# Patient Record
Sex: MALE | Race: WHITE | Employment: FULL TIME | URBAN - METROPOLITAN AREA
[De-identification: names, ages, dates, MRNs, and addresses within clinical notes are randomized per-mention and may not be internally consistent; named-entity substitution may affect disease eponyms.]

---

## 2017-03-03 ENCOUNTER — OFFICE VISIT (OUTPATIENT)
Dept: CARDIOLOGY CLINIC | Age: 72
End: 2017-03-03

## 2017-03-03 VITALS
BODY MASS INDEX: 31.61 KG/M2 | DIASTOLIC BLOOD PRESSURE: 80 MMHG | WEIGHT: 225.8 LBS | HEIGHT: 71 IN | SYSTOLIC BLOOD PRESSURE: 130 MMHG | HEART RATE: 66 BPM | RESPIRATION RATE: 16 BRPM

## 2017-03-03 DIAGNOSIS — E78.5 DYSLIPIDEMIA, GOAL LDL BELOW 70: ICD-10-CM

## 2017-03-03 DIAGNOSIS — I25.10 CORONARY ARTERY DISEASE INVOLVING NATIVE CORONARY ARTERY OF NATIVE HEART WITHOUT ANGINA PECTORIS: Primary | ICD-10-CM

## 2017-03-03 DIAGNOSIS — I10 HTN (HYPERTENSION), BENIGN: ICD-10-CM

## 2017-03-03 NOTE — PROGRESS NOTES
[facility] [address]:   [phone]      HPI: Real Thomason, a 73y.o. year-old who presents for follow up regarding CAD. Still having right leg pain from sciatica, undergoing treatment  Hurts mostly with standing or after he stops walking   No epigastric pain or unsteadiness recently (his angina equivalent)  Denies chest pain or dyspnea  Denies dizziness  No syncope  No LE edema  Not exercising due to sciatic pain  Prior to his MI in 2014 he felt unsteady on his feet and had epigastric pain  Says he thinks he has low testosterone     Assessment/Plan:  1. CAD - LAD 99% proximal lesion with STEMI in 3/14, s/p PCI with LARA to LAD, MLI in other vessels, off Effient now, continue ASA 81MU daily, Bystolic and Lipitor, follow up in 1 year  -asymptomatic, will plan to reassess for ischemia 3 years post stress test   2. Dyslipidemia - Lipids 2/17 - , TG 69, LDL 56, HDL 31, continue atorvastatin  3. Obesity - Body mass index is 31.49 kg/(m^2). cont weight loss efforts  4. HTN-well controlled on Bystolic   5. VT--post cath/stent, on amio drip while inpatient, on bystolic now without recurrence. 6. Hx of chronic systolic heart failure - LVEF 30% post cath but now up to 65% by TTE 0/50, on Bystolic    7. Hypothyroidism - TSH 3.4 in 2/17, on levothyroxine  8. Edema - minimal today  9. Fatigue- improved on bystolic    10. Possible low testosterone - discussed referral to endocrinology for evaluation and treatment options but he prefers to wait on that at this time    Echo 5/16: LVEF 65%, grade 1 dd, mild MR, mild TR  Nuclear: 2/16, NL Mirna nuc, EF 58%  Echo 12/14 EF 65%    TTE 3/14 EF 30%, multiple WMA  Cath 3/14 100% proximal LAD, [id number] LARA  EF 30%, Mod  Anteroapical hypokinesis, normal valves    Soc no tob no etoh   Fhx + for CAD in father    He  has a past medical history of CAD (coronary artery disease); Essential hypertension;  Hyperlipidemia; STEMI (ST elevation myocardial infarction) ([address] (3/10/2014); Thyroid disease; and Unspecified sleep apnea. Cardiovascular ROS: no chest pain or dyspnea on exertion  Respiratory ROS: no cough, wheezing  Neurological ROS: no TIA or stroke symptoms  All other systems negative except as above. PE  Vitals:    03/03/17 0949   BP: 130/80   Pulse: 66   Resp: 16   Weight: 225 lb 12.8 oz (102.4 kg)   Height: 5' 11\" (1.803 m)    Body mass index is 31.49 kg/(m^2).    General appearance - alert, well appearing, and in no distress  Mental status - affect appropriate to mood  Eyes - sclera anicteric, moist mucous membranes  Neck - supple, no significant adenopathy  Lymphatics - no  lymphadenopathy  Chest - clear to auscultation, no wheezes, rales or rhonchi  Heart - normal rate, regular rhythm, normal S1, S2, no murmurs, rubs, clicks or gallops  Abdomen - soft, nontender, nondistended  Back exam - full range of motion, no tenderness  Neurological - cranial nerves II through XII grossly intact, no focal deficit  Musculoskeletal - no muscular tenderness noted, normal strength  Extremities - peripheral pulses normal, trace LE edema  Skin - normal coloration  no rashes    12 lead ECG: NSR with poor R wave progression    Recent Labs:  Lab Results   Component Value Date/Time    Cholesterol, total 115 05/20/2016 09:12 AM    HDL Cholesterol 36 05/20/2016 09:12 AM    LDL, calculated 62 05/20/2016 09:12 AM    Triglyceride 84 05/20/2016 09:12 AM    CHOL/HDL Ratio 4.3 03/06/2014 07:22 AM     Lab Results   Component Value Date/Time    Creatinine 1.17 05/20/2016 09:12 AM     Lab Results   Component Value Date/Time    BUN 20 05/20/2016 09:12 AM     Lab Results   Component Value Date/Time    Potassium 4.5 05/20/2016 09:12 AM     Lab Results   Component Value Date/Time    Hemoglobin A1c 6.0 03/06/2014 04:53 PM     Lab Results   Component Value Date/Time    HGB 15.1 05/20/2016 09:12 AM     Lab Results   Component Value Date/Time    PLATELET 407 42/69/1706 09:12 AM       Reviewed:  Past Medical History:   Diagnosis Date    CAD (coronary artery disease)     Essential hypertension     Hyperlipidemia     STEMI (ST elevation myocardial infarction) ([facility] 3/10/2014    Thyroid disease     Unspecified sleep apnea      History   Smoking Status    Never Smoker   Smokeless Tobacco    Never Used     History   Alcohol Use    Yes     Comment: once or twice a month     Allergies   Allergen Reactions    Shellfish Derived Itching       Current Outpatient Prescriptions   Medication Sig    nitroglycerin (NITROSTAT) 0.4 mg SL tablet 1 Tab by SubLINGual route every five (5) minutes as needed for Chest Pain.  BYSTOLIC 2.5 mg tablet Take 1 tablet by mouth  daily    atorvastatin (LIPITOR) 20 mg tablet Take 1 tablet by mouth  nightly    levothyroxine (SYNTHROID) 25 mcg tablet 25 mcg daily.  naproxen sodium (ALEVE) 220 mg cap Take 440 mg by mouth as needed.  multivitamin (ONE A DAY) tablet Take 1 tablet by mouth daily.  aspirin 81 mg chewable tablet Take 2 Tabs by mouth daily. (Patient taking differently: Take 81 mg by mouth daily.)     No current facility-administered medications for this visit.         Jacqueline Pimentel MD  [facility] heart and Vascular [place]  [address] [facility] [address]  [address]

## 2017-03-03 NOTE — MR AVS SNAPSHOT
Visit Information Date & Time Provider Department Dept. Phone Encounter #  
 3/3/2017  9:40 AM Meryl Lizama MD [facility] [phone] [id number] Upcoming Health Maintenance Date Due Hepatitis C Screening 1945 FOBT Q 1 YEAR AGE 50-75 8/20/1997 DTaP/Tdap/Td series (1 - Tdap) 10/8/2005 ZOSTER VACCINE AGE 60> 8/20/2007 GLAUCOMA SCREENING Q2Y 8/20/2012 Pneumococcal 65+ Low/Medium Risk (1 of 2 - PCV13) 8/20/2012 MEDICARE YEARLY EXAM 8/20/2012 INFLUENZA AGE 9 TO ADULT 8/1/2016 Allergies as of 3/3/2017  Review Complete On: 3/3/2017 By: Jane Hoyos Severity Noted Reaction Type Reactions Shellfish Derived  05/08/2014    Itching Current Immunizations  Reviewed on 3/28/2014 Name Date Influenza Vaccine  Deferred (Patient Refused) Pneumococcal Polysaccharide (PPSV-23)  Deferred (Patient Refused) TD Vaccine 10/7/2005 Not reviewed this visit You Were Diagnosed With   
  
 Codes Comments Coronary artery disease involving native coronary artery of native heart without angina pectoris    -  Primary ICD-10-CM: I25.10 ICD-9-CM: 414.01   
 HTN (hypertension), benign     ICD-10-CM: I10 
ICD-9-CM: 401.1 Dyslipidemia, goal LDL below 70     ICD-10-CM: E78.5 ICD-9-CM: 272.4 Vitals BP  
  
  
  
  
  
 130/80 (BP 1 Location: Left arm, BP Patient Position: Sitting) Vitals History BMI and BSA Data Body Mass Index Body Surface Area  
 31.49 kg/m 2 2.26 m 2 Preferred Pharmacy Pharmacy Name Phone [facility] [phone] Your Updated Medication List  
  
   
This list is accurate as of: 3/3/17 10:22 AM.  Always use your most recent med list.  
  
  
  
  
 ALEVE 220 mg Cap Generic drug:  naproxen sodium Take 440 mg by mouth as needed. aspirin 81 mg chewable tablet Take 2 Tabs by mouth daily. atorvastatin 20 mg tablet Commonly known as:  LIPITOR Take 1 tablet by mouth  nightly BYSTOLIC 2.5 mg tablet Generic drug:  nebivolol Take 1 tablet by mouth  daily  
  
 levothyroxine 25 mcg tablet Commonly known as:  SYNTHROID  
25 mcg daily. multivitamin tablet Commonly known as:  ONE A DAY Take 1 tablet by mouth daily. nitroglycerin 0.4 mg SL tablet Commonly known as:  NITROSTAT  
1 Tab by SubLINGual route every five (5) minutes as needed for Chest Pain. We Performed the Following AMB POC EKG ROUTINE W/ 12 LEADS, INTER & REP [01372 CPT(R)] Patient Instructions Try to get as much exercise as possible and limit your carbohydrate intake Introducing \[facility]\"" & HEALTH SERVICES! Katelyn Mckinney introduces [employer] patient portal. Now you can access parts of your medical record, email your doctor's office, and request medication refills online. 1. In your internet browser, go to [email or web address]/Bill Me Later 2. Click on the First Time User? Click Here link in the Sign In box. You will see the New Member Sign Up page. 3. Enter your [employer] Access Code exactly as it appears below. You will not need to use this code after youve completed the sign-up process. If you do not sign up before the expiration date, you must request a new code. · [employer] Access Code: [email or web address] Expires: 6/1/2017 10:22 AM 
 
4. Enter the last four digits of your Social Security Number (xxxx) and Date of Birth (mm/dd/yyyy) as indicated and click Submit. You will be taken to the next sign-up page. 5. Create a [employer] ID. This will be your [employer] login ID and cannot be changed, so think of one that is secure and easy to remember. 6. Create a [employer] password. You can change your password at any time. 7. Enter your Password Reset Question and Answer. This can be used at a later time if you forget your password. 8. Enter your e-mail address. You will receive e-mail notification when new information is available in [address] 9. Click Sign Up. You can now view and download portions of your medical record. 10. Click the Download Summary menu link to download a portable copy of your medical information. If you have questions, please visit the Frequently Asked Questions section of the [employer] website. Remember, [employer] is NOT to be used for urgent needs. For medical emergencies, dial 911. Now available from your iPhone and Android! Please provide this summary of care documentation to your next provider. Your primary care clinician is listed as Chris Reynolds. If you have any questions after today's visit, please call [phone].

## 2017-07-11 RX ORDER — NEBIVOLOL HYDROCHLORIDE 2.5 MG/1
TABLET ORAL
Qty: 90 TAB | Refills: 3 | Status: SHIPPED | OUTPATIENT
Start: 2017-07-11 | End: 2017-10-13 | Stop reason: SDUPTHER

## 2017-11-24 RX ORDER — ATORVASTATIN CALCIUM 20 MG/1
20 TABLET, FILM COATED ORAL DAILY
Qty: 90 TAB | Refills: 3 | Status: SHIPPED | OUTPATIENT
Start: 2017-11-24 | End: 2018-11-12 | Stop reason: SDUPTHER

## 2017-11-24 RX ORDER — NEBIVOLOL 2.5 MG/1
TABLET ORAL
Qty: 90 TAB | Refills: 3 | Status: SHIPPED | OUTPATIENT
Start: 2017-11-24 | End: 2018-07-17 | Stop reason: ALTCHOICE

## 2017-11-24 RX ORDER — ATORVASTATIN CALCIUM 20 MG/1
20 TABLET, FILM COATED ORAL DAILY
Qty: 90 TAB | Refills: 3 | Status: SHIPPED | OUTPATIENT
Start: 2017-11-24 | End: 2017-11-24 | Stop reason: SDUPTHER

## 2017-11-24 NOTE — TELEPHONE ENCOUNTER
Requested Prescriptions     Signed Prescriptions Disp Refills    nebivolol (BYSTOLIC) 2.5 mg tablet 90 Tab 3     Sig: Take 1 tablet by mouth  daily     Authorizing Provider: Samina [facility]     Ordering User: Rose Driscoll    atorvastatin (LIPITOR) 20 mg tablet 90 Tab 3     Sig: Take 1 Tab by mouth daily. Authorizing Provider: Samina Vasquez     Ordering User: Rose Driscoll     Called patient, verified identity. Patient states he recently changed his insurance company and needs new script of Bystolic and lipitor. ALso needs a 2 week supply of lipitor to hold him over until mail delivery takes effect.

## 2017-11-24 NOTE — TELEPHONE ENCOUNTER
Please 2 week supply of Atovastatin and Bystolic  to [facility] [phone].     Send 90 days supply of Atovastatin and Bystolic to [facility] [phone]

## 2018-03-02 ENCOUNTER — OFFICE VISIT (OUTPATIENT)
Dept: CARDIOLOGY CLINIC | Age: 73
End: 2018-03-02

## 2018-03-02 VITALS
RESPIRATION RATE: 16 BRPM | WEIGHT: 226.6 LBS | DIASTOLIC BLOOD PRESSURE: 84 MMHG | HEART RATE: 70 BPM | SYSTOLIC BLOOD PRESSURE: 120 MMHG | BODY MASS INDEX: 31.72 KG/M2 | HEIGHT: 71 IN

## 2018-03-02 DIAGNOSIS — E78.5 DYSLIPIDEMIA, GOAL LDL BELOW 70: ICD-10-CM

## 2018-03-02 DIAGNOSIS — I10 HTN (HYPERTENSION), BENIGN: Primary | ICD-10-CM

## 2018-03-02 DIAGNOSIS — I21.3 ST ELEVATION MYOCARDIAL INFARCTION (STEMI), UNSPECIFIED ARTERY (HCC): ICD-10-CM

## 2018-03-02 DIAGNOSIS — I25.10 CORONARY ARTERY DISEASE INVOLVING NATIVE CORONARY ARTERY OF NATIVE HEART WITHOUT ANGINA PECTORIS: ICD-10-CM

## 2018-03-02 NOTE — MR AVS SNAPSHOT
[address] [facility] [address] [place] 
[phone] Patient: Steve Barton MRN:  [id number] Visit Information Date & Time Provider Department Dept. Phone Encounter #  
 3/2/2018  9:40 AM Katie Hill MD [facility] [place] [phone] [id number] Your Appointments 3/5/2019  9:00 AM  
STRESS ECHOCARDIOGRAMS with [facility] ([place] SCHEDULING) Appt Note: stress echo for CAD 9:00 Dr. Sage Priest 10:00 kmr [address] [place] [address]  
[address] [place] [address]  
  
    
 3/5/2019  9:00 AM  
ECHO CARDIOGRAMS 2D with ECHO, [facility] ([place] SCHEDULING) Appt Note: stress echo for CAD 9:00 Dr. Sage Priest 10:00 kmr [address] [facility] [address]  
[address] [place] [address]  
  
    
 3/5/2019 10:00 AM  
ESTABLISHED PATIENT with Katie Hill MD  
[facility] ([address] [facility]) Appt Note: stress echo for CAD 9:00 Dr. Sage Priest 10:00 kmr [address] [place] [address]  
[address] [facility],[address] [address] Upcoming Health Maintenance Date Due Hepatitis C Screening 1945 FOBT Q 1 YEAR AGE 50-75 8/20/1997 DTaP/Tdap/Td series (1 - Tdap) 10/8/2005 ZOSTER VACCINE AGE 60> 6/20/2007 GLAUCOMA SCREENING Q2Y 8/20/2012 Pneumococcal 65+ Low/Medium Risk (1 of 2 - PCV13) 8/20/2012 MEDICARE YEARLY EXAM 8/20/2012 Influenza Age 5 to Adult 8/1/2017 Allergies as of 3/2/2018  Review Complete On: 3/2/2018 By: Alondra Mackenzie Severity Noted Reaction Type Reactions Shellfish Derived  05/08/2014    Itching Current Immunizations  Reviewed on 3/28/2014 Name Date Influenza Vaccine  Deferred (Patient Refused) Pneumococcal Polysaccharide (PPSV-23)  Deferred (Patient Refused) TD Vaccine 10/7/2005 Not reviewed this visit You Were Diagnosed With   
  
 Codes Comments HTN (hypertension), benign    -  Primary ICD-10-CM: I10 
ICD-9-CM: 401.1 Dyslipidemia, goal LDL below 70     ICD-10-CM: E78.5 ICD-9-CM: 272.4 Coronary artery disease involving native coronary artery of native heart without angina pectoris     ICD-10-CM: I25.10 ICD-9-CM: 414.01   
 ST elevation myocardial infarction (STEMI), unspecified artery ([facility] [address]     ICD-10-CM: I21.3 ICD-9-CM: 410.90 Vitals BP Pulse Resp Height(growth percentile) Weight(growth percentile) BMI  
 120/84 (BP 1 Location: Left arm, BP Patient Position: Sitting) 70 16 5' 11\" (1.803 m) 226 lb 9.6 oz (102.8 kg) 31.6 kg/m2 Smoking Status Never Smoker Vitals History BMI and BSA Data Body Mass Index Body Surface Area  
 31.6 kg/m 2 2.27 m 2 Preferred Pharmacy Pharmacy Name Phone [address] [facility] [phone] Your Updated Medication List  
  
   
This list is accurate as of 3/2/18 10:12 AM.  Always use your most recent med list.  
  
  
  
  
 ALEVE 220 mg Cap Generic drug:  naproxen sodium Take 440 mg by mouth as needed. aspirin 81 mg chewable tablet Take 2 Tabs by mouth daily. atorvastatin 20 mg tablet Commonly known as:  LIPITOR Take 1 Tab by mouth daily. levothyroxine 25 mcg tablet Commonly known as:  SYNTHROID  
25 mcg daily. multivitamin tablet Commonly known as:  ONE A DAY Take 1 tablet by mouth daily. nebivolol 2.5 mg tablet Commonly known as:  BYSTOLIC Take 1 tablet by mouth  daily  
  
 nitroglycerin 0.4 mg SL tablet Commonly known as:  NITROSTAT 1 Tab by SubLINGual route every five (5) minutes as needed for Chest Pain. We Performed the Following AMB POC EKG ROUTINE W/ 12 LEADS, INTER & REP [93752 CPT(R)] Introducing [facility] & [facility] SERVICES! [employer] introduces [employer] patient portal. Now you can access parts of your medical record, email your doctor's office, and request medication refills online. 1. In your internet browser, go to [email or web address] Who Launch 2. Click on the First Time User? Click Here link in the Sign In box. You will see the New Member Sign Up page. 3. Enter your [employer] Access Code exactly as it appears below. You will not need to use this code after youve completed the sign-up process. If you do not sign up before the expiration date, you must request a new code. · [employer] Access Code: [email or web address] Expires: 5/31/2018  8:33 AM 
 
4. Enter the last four digits of your Social Security Number (xxxx) and Date of Birth (mm/dd/yyyy) as indicated and click Submit. You will be taken to the next sign-up page. 5. Create a [employer] ID. This will be your [employer] login ID and cannot be changed, so think of one that is secure and easy to remember. 6. Create a [employer] password. You can change your password at any time. 7. Enter your Password Reset Question and Answer. This can be used at a later time if you forget your password. 8. Enter your e-mail address. You will receive e-mail notification when new information is available in [address] 9. Click Sign Up. You can now view and download portions of your medical record. 10. Click the Download Summary menu link to download a portable copy of your medical information. If you have questions, please visit the Frequently Asked Questions section of the [employer] website. Remember, [employer] is NOT to be used for urgent needs. For medical emergencies, dial 911. Now available from your iPhone and Android! Please provide this summary of care documentation to your next provider. If you have any questions after today's visit, please call [phone].

## 2018-03-02 NOTE — PROGRESS NOTES
[facility] [place] [address]:   [phone]    HPI: Kari Lafleur, a 81y.o. year-old who presents for follow up regarding CAD. He is trying a new male enhancement supplement-Invigorate. Still having right leg pain from sciatica, undergoing treatment  Hurts mostly with standing or after he stops walking   No epigastric pain or unsteadiness recently (his angina equivalent)  Denies chest pain or dyspnea  Denies dizziness, syncope, LE edema  Not exercising due to sciatic pain  Prior to his MI in 2014 he felt unsteady on his feet and had epigastric pain  Says he thinks he has low testosterone     Assessment/Plan:  1. CAD - LAD 99% proximal lesion with STEMI in 3/14, s/p PCI with LARA to LAD, MLI in other vessels, off Effient now, continue ASA 93CB daily, Bystolic and Lipitor  -asymptomatic, will plan to reassess for ischemia 3 years post stress test   2. Dyslipidemia - Lipids good in 917 with Pt Firs  3. Obesity - Body mass index is 31.6 kg/(m^2). cont weight loss efforts  4. HTN-well controlled on Bystolic   5. VT--post cath/stent, on amio drip while inpatient, on bystolic now without recurrence. 6. Hx of chronic systolic heart failure - LVEF 30% post cath but now up to 65% by TTE 6/79, on Bystolic    7. Hypothyroidism - TSH 3.4 in 2/17, on levothyroxine  8. Edema - minimal today  9. Fatigue- improved on bystolic    10. Possible low testosterone - discussed referral to endocrinology for evaluation and treatment options but he prefers to wait on that at this time  6. PreDM- new diagnosis will watch diet and exercise    Echo 5/16: LVEF 65%, grade 1 dd, mild MR, mild TR  Nuclear: 2/16, NL Mirna nuc, EF 58%  Echo 12/14 EF 65%    TTE 3/14 EF 30%, multiple WMA  Cath 3/14 100% proximal LAD, [id number] LARA  EF 30%, Mod  Anteroapical hypokinesis, normal valves    Soc no tob no etoh   Fhx + for CAD in father    He  has a past medical history of CAD (coronary artery disease); Essential hypertension;  Hyperlipidemia; [facility]) [facility] (3/10/2014); Thyroid disease; and Unspecified sleep apnea. Cardiovascular ROS: no chest pain or dyspnea on exertion  Respiratory ROS: no cough, wheezing  Neurological ROS: no TIA or stroke symptoms  All other systems negative except as above. PE  Vitals:    03/02/18 0948   BP: 120/84   Pulse: 70   Resp: 16   Weight: 226 lb 9.6 oz (102.8 kg)   Height: 5' 11\" (1.803 m)    Body mass index is 31.6 kg/(m^2).    General appearance - alert, well appearing, and in no distress  Mental status - affect appropriate to mood  Eyes - sclera anicteric, moist mucous membranes  Neck - supple, no significant adenopathy  Lymphatics - no  lymphadenopathy  Chest - clear to auscultation, no wheezes, rales or rhonchi  Heart - normal rate, regular rhythm, normal S1, S2, no murmurs, rubs, clicks or gallops  Abdomen - soft, nontender, nondistended  Back exam - full range of motion, no tenderness  Neurological - cranial nerves II through XII grossly intact, no focal deficit  Musculoskeletal - no muscular tenderness noted, normal strength  Extremities - peripheral pulses normal, trace LE edema  Skin - normal coloration  no rashes    12 lead ECG: NSR with poor R wave progression    Recent Labs:  Lab Results   Component Value Date/Time    Cholesterol, total 115 05/20/2016 09:12 AM    HDL Cholesterol 36 (L) 05/20/2016 09:12 AM    LDL, calculated 62 05/20/2016 09:12 AM    Triglyceride 84 05/20/2016 09:12 AM    CHOL/HDL Ratio 4.3 03/06/2014 07:22 AM     Lab Results   Component Value Date/Time    Creatinine 1.17 05/20/2016 09:12 AM     Lab Results   Component Value Date/Time    BUN 20 05/20/2016 09:12 AM     Lab Results   Component Value Date/Time    Potassium 4.5 05/20/2016 09:12 AM     Lab Results   Component Value Date/Time    Hemoglobin A1c 6.0 03/06/2014 04:53 PM     Lab Results   Component Value Date/Time    HGB 15.1 05/20/2016 09:12 AM     Lab Results   Component Value Date/Time    PLATELET 686 05/20/2016 09:12 AM       Reviewed:  Past Medical History:   Diagnosis Date    CAD (coronary artery disease)     Essential hypertension     Hyperlipidemia     STEMI (ST elevation myocardial infarction) ([facility] [address] 3/10/2014    Thyroid disease     Unspecified sleep apnea      History   Smoking Status    Never Smoker   Smokeless Tobacco    Never Used     History   Alcohol Use    Yes     Comment: once or twice a month     Allergies   Allergen Reactions    Shellfish Derived Itching       Current Outpatient Prescriptions   Medication Sig    nebivolol (BYSTOLIC) 2.5 mg tablet Take 1 tablet by mouth  daily    atorvastatin (LIPITOR) 20 mg tablet Take 1 Tab by mouth daily.  nitroglycerin (NITROSTAT) 0.4 mg SL tablet 1 Tab by SubLINGual route every five (5) minutes as needed for Chest Pain.  levothyroxine (SYNTHROID) 25 mcg tablet 25 mcg daily.  naproxen sodium (ALEVE) 220 mg cap Take 440 mg by mouth as needed.  multivitamin (ONE A DAY) tablet Take 1 tablet by mouth daily.  aspirin 81 mg chewable tablet Take 2 Tabs by mouth daily. (Patient taking differently: Take 81 mg by mouth daily.)     No current facility-administered medications for this visit.         Molly Lima MD  [facility] heart and Vascular [place]  [address] [facility] [address]  [address]

## 2018-05-22 ENCOUNTER — TELEPHONE (OUTPATIENT)
Dept: CARDIOLOGY CLINIC | Age: 73
End: 2018-05-22

## 2018-05-22 RX ORDER — NITROGLYCERIN 0.4 MG/1
0.4 TABLET SUBLINGUAL
Qty: 1 BOTTLE | Refills: 2 | Status: SHIPPED | OUTPATIENT
Start: 2018-05-22 | End: 2019-11-18 | Stop reason: SDUPTHER

## 2018-05-22 NOTE — TELEPHONE ENCOUNTER
Pharmacy verified. Requested Prescriptions     Pending Prescriptions Disp Refills    nitroglycerin (NITROSTAT) 0.4 mg SL tablet 1 Bottle 2     Si Tab by SubLINGual route every five (5) minutes as needed for Chest Pain. Thanks!

## 2018-05-22 NOTE — TELEPHONE ENCOUNTER
Requested Prescriptions     Signed Prescriptions Disp Refills    nitroglycerin (NITROSTAT) 0.4 mg SL tablet 1 Bottle 2     Si Tab by SubLINGual route every five (5) minutes as needed for Chest Pain.  Max dose of 3     Authorizing Provider: Presley Dong     Ordering User: George Broderick     Per orders

## 2018-07-16 ENCOUNTER — TELEPHONE (OUTPATIENT)
Dept: CARDIOLOGY CLINIC | Age: 73
End: 2018-07-16

## 2018-07-16 NOTE — TELEPHONE ENCOUNTER
Patient said bystolic is too much money for him now, is there an alternative he could take that may be cheaper   Phone: [phone]

## 2018-07-17 ENCOUNTER — TELEPHONE (OUTPATIENT)
Dept: CARDIOLOGY CLINIC | Age: 73
End: 2018-07-17

## 2018-07-17 RX ORDER — METOPROLOL SUCCINATE 25 MG/1
TABLET, EXTENDED RELEASE ORAL DAILY
COMMUNITY
End: 2018-07-17 | Stop reason: SDUPTHER

## 2018-07-17 RX ORDER — METOPROLOL SUCCINATE 25 MG/1
25 TABLET, EXTENDED RELEASE ORAL DAILY
Qty: 30 TAB | Refills: 6 | Status: SHIPPED | OUTPATIENT
Start: 2018-07-17 | End: 2018-10-05 | Stop reason: SDUPTHER

## 2018-07-17 NOTE — TELEPHONE ENCOUNTER
Returned patient's call, 2 pt identifiers used  Discussed new medication. Advised it Metoprolol Succinate is generic now.

## 2018-07-17 NOTE — TELEPHONE ENCOUNTER
I spoke to patient and advised per NP rowena to stop Bystolic and he may switch to Toprol XL 25 mg 1 tab daily. He will monitor his blood pressure and call in 2 weeks with an update. 2 pt identifiers used      Requested Prescriptions     Signed Prescriptions Disp Refills    metoprolol succinate (TOPROL XL) 25 mg XL tablet 30 Tab 6     Sig: Take 1 Tab by mouth daily.      Authorizing Provider: Senthil Howard     Ordering User: Helen Soloroi

## 2018-07-17 NOTE — TELEPHONE ENCOUNTER
He can stop bystolic and begin toprol XL 25mg daily. Please ask him to check BP daily and call the office in 2 weeks with his readings after making this change.

## 2018-10-05 RX ORDER — METOPROLOL SUCCINATE 25 MG/1
25 TABLET, EXTENDED RELEASE ORAL DAILY
Qty: 90 TAB | Refills: 3 | Status: SHIPPED | OUTPATIENT
Start: 2018-10-05 | End: 2019-09-30 | Stop reason: SDUPTHER

## 2018-10-05 NOTE — TELEPHONE ENCOUNTER
Requested Prescriptions     Signed Prescriptions Disp Refills    metoprolol succinate (TOPROL XL) 25 mg XL tablet 90 Tab 3     Sig: Take 1 Tab by mouth daily.      Authorizing Provider: Monico Dutton     Ordering User: Patria Lim     Per orders

## 2018-11-12 RX ORDER — ATORVASTATIN CALCIUM 20 MG/1
20 TABLET, FILM COATED ORAL DAILY
Qty: 90 TAB | Refills: 3 | Status: SHIPPED | OUTPATIENT
Start: 2018-11-12 | End: 2020-02-19 | Stop reason: SDUPTHER

## 2018-11-12 NOTE — TELEPHONE ENCOUNTER
Requested Prescriptions     Signed Prescriptions Disp Refills    atorvastatin (LIPITOR) 20 mg tablet 90 Tab 3     Sig: Take 1 Tab by mouth daily.      Authorizing Provider: Karol Daly     Ordering User: Verena Enciso     Per verbal orders

## 2019-02-27 DIAGNOSIS — I25.10 CORONARY ARTERY DISEASE INVOLVING NATIVE CORONARY ARTERY OF NATIVE HEART WITHOUT ANGINA PECTORIS: Primary | ICD-10-CM

## 2019-03-01 ENCOUNTER — OFFICE VISIT (OUTPATIENT)
Dept: CARDIOLOGY CLINIC | Age: 74
End: 2019-03-01

## 2019-03-01 VITALS
BODY MASS INDEX: 31.11 KG/M2 | RESPIRATION RATE: 16 BRPM | WEIGHT: 222.2 LBS | HEIGHT: 71 IN | DIASTOLIC BLOOD PRESSURE: 80 MMHG | SYSTOLIC BLOOD PRESSURE: 140 MMHG | HEART RATE: 88 BPM

## 2019-03-01 DIAGNOSIS — E78.5 DYSLIPIDEMIA, GOAL LDL BELOW 70: ICD-10-CM

## 2019-03-01 DIAGNOSIS — E78.5 DYSLIPIDEMIA: ICD-10-CM

## 2019-03-01 DIAGNOSIS — I10 HTN (HYPERTENSION), BENIGN: Primary | ICD-10-CM

## 2019-03-01 DIAGNOSIS — I25.10 CORONARY ARTERY DISEASE INVOLVING NATIVE CORONARY ARTERY OF NATIVE HEART WITHOUT ANGINA PECTORIS: ICD-10-CM

## 2019-03-01 DIAGNOSIS — E11.59 TYPE 2 DIABETES MELLITUS WITH OTHER CIRCULATORY COMPLICATION, WITHOUT LONG-TERM CURRENT USE OF INSULIN (HCC): ICD-10-CM

## 2019-09-30 RX ORDER — METOPROLOL SUCCINATE 25 MG/1
TABLET, EXTENDED RELEASE ORAL
Qty: 90 TAB | Refills: 3 | Status: SHIPPED | OUTPATIENT
Start: 2019-09-30 | End: 2020-08-26

## 2019-11-18 RX ORDER — NITROGLYCERIN 0.4 MG/1
0.4 TABLET SUBLINGUAL
Qty: 1 BOTTLE | Refills: 2 | Status: SHIPPED | OUTPATIENT
Start: 2019-11-18 | End: 2020-01-09

## 2019-11-18 NOTE — TELEPHONE ENCOUNTER
Requested Prescriptions     Signed Prescriptions Disp Refills    nitroglycerin (NITROSTAT) 0.4 mg SL tablet 1 Bottle 2     Si Tab by SubLINGual route every five (5) minutes as needed for Chest Pain (max dose of 3).  Max dose of 3     Authorizing Provider: Homa Greenwood     Ordering User: Roselia Pimentel     Per verbal orders

## 2020-01-09 RX ORDER — NITROGLYCERIN 0.4 MG/1
0.4 TABLET SUBLINGUAL
Qty: 25 TAB | Refills: 2 | Status: SHIPPED | OUTPATIENT
Start: 2020-01-09 | End: 2020-08-06

## 2020-03-03 ENCOUNTER — OFFICE VISIT (OUTPATIENT)
Dept: CARDIOLOGY CLINIC | Age: 75
End: 2020-03-03

## 2020-03-03 VITALS
RESPIRATION RATE: 16 BRPM | DIASTOLIC BLOOD PRESSURE: 70 MMHG | WEIGHT: 231 LBS | HEART RATE: 76 BPM | HEIGHT: 71 IN | SYSTOLIC BLOOD PRESSURE: 120 MMHG | BODY MASS INDEX: 32.34 KG/M2 | OXYGEN SATURATION: 97 %

## 2020-03-03 DIAGNOSIS — I25.10 CORONARY ARTERY DISEASE INVOLVING NATIVE CORONARY ARTERY OF NATIVE HEART WITHOUT ANGINA PECTORIS: ICD-10-CM

## 2020-03-03 DIAGNOSIS — R73.02 IGT (IMPAIRED GLUCOSE TOLERANCE): ICD-10-CM

## 2020-03-03 DIAGNOSIS — I21.3 ST ELEVATION MYOCARDIAL INFARCTION (STEMI), UNSPECIFIED ARTERY (HCC): Primary | ICD-10-CM

## 2020-03-03 DIAGNOSIS — I10 HTN (HYPERTENSION), BENIGN: ICD-10-CM

## 2020-03-03 DIAGNOSIS — E78.5 DYSLIPIDEMIA, GOAL LDL BELOW 70: ICD-10-CM

## 2020-03-03 RX ORDER — ATORVASTATIN CALCIUM 20 MG/1
20 TABLET, FILM COATED ORAL DAILY
Qty: 90 TAB | Refills: 3 | Status: SHIPPED | OUTPATIENT
Start: 2020-03-03 | End: 2021-05-05

## 2020-03-03 RX ORDER — LEVOTHYROXINE SODIUM 50 UG/1
TABLET ORAL
COMMUNITY

## 2020-03-03 NOTE — PROGRESS NOTES
[facility] [place] [facility]:   [phone]    HPI: Reno Girard, a 69y.o. year-old who presents for follow up regarding CAD. He is working full time again 8-3 comes home and takes a nap. Then when he gets up they have dinner. Encuraged him to exercise more, he is standing at work ,etc but just not walking/ exercising. He got a bad sneeze pain in the left chest a week ago that is still there now. Pulled an intercostal muscle it sounds like. But eating well. Snacks at work but not too much. Some fatigue, doing ok on Toprol, prefers it now due to cost(prev on bystolic)    No epigastric pain or unsteadiness recently (his angina equivalent)  Denies chest pain or dyspnea  Denies dizziness, syncope, LE edema  Not exercising due to sciatic pain and hip pain. Prior to his MI in 2014 he felt unsteady on his feet and had epigastric pain  Says he thinks he has low testosterone    **After his last office visit labs received 3/8/19 dated 3/6/19  CBC ok, Mg 2.1, A1C 6.0%  TSH 4.8  , TG 78, LDL 59, HDL 34  BUN 20, Cr 1.2, K 4.5  Labs need checking toay. Assessment/Plan:  1. CAD - LAD 99% proximal lesion with STEMI in 3/14, s/p PCI with LARA to LAD, MLI in other vessels, off Effient now, continue ASA 81mg daily toprol, atorvastatin  -just fatigue now, stress echo ok 3/19  2. Dyslipidemia - at goal on labs 3/19 LDL 59  3. Obesity - Body mass index is 32.22 kg/m². cont weight loss efforts  4. HTN-well controlled on Bystolic   5. VT--post cath/stent, on amio drip while inpatient, on metoprolol now, controlled, no sx  6. Hx of chronic systolic heart failure - LVEF 30% post cath but now up to 65% by TTE 0/59, on Bystolic    7. Hypothyroidism -  on levothyroxine  8. Edema - minimal today  9. Fatigue- improved on bystolic    10.  PreDM- new diagnosis will watch diet and exercise check a1c today    Stress Echo 3/19 6:00,   Echo 5/16: LVEF 65%, grade 1 dd, mild MR, mild TR  Nuclear: 2/16, NL Mirna nuc, EF 58%  Echo 12/14 EF 65%    TTE 3/14 EF 30%, multiple WMA  Cath 3/14 100% proximal LAD, [id number] LARA  EF 30%, Mod  Anteroapical hypokinesis, normal valves    Soc no tob no etoh   Fhx + for CAD in father    He  has a past medical history of CAD (coronary artery disease), Essential hypertension, Hyperlipidemia, STEMI (ST elevation myocardial infarction) ([address] (3/10/2014), Thyroid disease, and Unspecified sleep apnea. Cardiovascular ROS: no chest pain or dyspnea on exertion  Respiratory ROS: no cough, wheezing  Neurological ROS: no TIA or stroke symptoms  All other systems negative except as above. PE  Vitals:    03/03/20 0928   BP: 120/70   Pulse: 76   Resp: 16   SpO2: 97%   Weight: 231 lb (104.8 kg)   Height: 5' 11\" (1.803 m)    Body mass index is 32.22 kg/m².    General appearance - alert, well appearing, and in no distress  Mental status - affect appropriate to mood  Eyes - sclera anicteric, moist mucous membranes  Neck - supple, no significant adenopathy  Lymphatics - no  lymphadenopathy  Chest - clear to auscultation, no wheezes, rales or rhonchi  Heart - normal rate, regular rhythm, normal S1, S2, no murmurs, rubs, clicks or gallops  Abdomen - soft, nontender, nondistended  Back exam - full range of motion, no tenderness  Neurological - cranial nerves II through XII grossly intact, no focal deficit  Musculoskeletal - no muscular tenderness noted, normal strength  Extremities - peripheral pulses normal, trace LE edema  Skin - normal coloration  no rashes    12 lead ECG: NSR with poor R wave progression    Recent Labs:  Lab Results   Component Value Date/Time    Cholesterol, total 115 05/20/2016 09:12 AM    HDL Cholesterol 36 (L) 05/20/2016 09:12 AM    LDL, calculated 62 05/20/2016 09:12 AM    Triglyceride 84 05/20/2016 09:12 AM    CHOL/HDL Ratio 4.3 03/06/2014 07:22 AM     Lab Results   Component Value Date/Time    Creatinine 1.17 05/20/2016 09:12 AM     Lab Results   Component Value Date/Time    BUN 20 05/20/2016 09:12 AM     Lab Results   Component Value Date/Time    Potassium 4.5 05/20/2016 09:12 AM     Lab Results   Component Value Date/Time    Hemoglobin A1c 6.0 03/06/2014 04:53 PM     Lab Results   Component Value Date/Time    HGB 15.1 05/20/2016 09:12 AM     Lab Results   Component Value Date/Time    PLATELET 197 44/31/0106 09:12 AM       Reviewed:  Past Medical History:   Diagnosis Date    CAD (coronary artery disease)     Essential hypertension     Hyperlipidemia     STEMI (ST elevation myocardial infarction) ([address] 3/10/2014    Thyroid disease     Unspecified sleep apnea      Social History     Tobacco Use   Smoking Status Never Smoker   Smokeless Tobacco Never Used     Social History     Substance and Sexual Activity   Alcohol Use Yes    Frequency: 2-4 times a month    Drinks per session: 1 or 2    Binge frequency: Never    Comment: once or twice a month     Allergies   Allergen Reactions    Shellfish Derived Itching       Current Outpatient Medications   Medication Sig    levothyroxine (SYNTHROID) 50 mcg tablet Take  by mouth Daily (before breakfast).  atorvastatin (LIPITOR) 20 mg tablet Take 1 Tab by mouth daily.  nitroglycerin (NITROSTAT) 0.4 mg SL tablet 1 TAB BY SUBLINGUAL ROUTE EVERY FIVE (5) MINUTES AS NEEDED FOR CHEST PAIN (MAX DOSE OF 3). MAX DOSE OF 3    metoprolol succinate (TOPROL-XL) 25 mg XL tablet TAKE 1 TABLET BY MOUTH EVERY DAY    naproxen sodium (ALEVE) 220 mg cap Take 440 mg by mouth as needed.  multivitamin (ONE A DAY) tablet Take 1 tablet by mouth daily.  aspirin 81 mg chewable tablet Take 2 Tabs by mouth daily. (Patient taking differently: Take 81 mg by mouth daily.)    levothyroxine (SYNTHROID) 25 mcg tablet 25 mcg daily. No current facility-administered medications for this visit.         Maricruz June MD  [facility] [place]  [address] [facility]  [address]

## 2020-03-06 ENCOUNTER — HOSPITAL ENCOUNTER (OUTPATIENT)
Dept: LAB | Age: 75
Discharge: HOME | End: 2020-03-06
Payer: MEDICARE

## 2020-03-06 PROCEDURE — 80061 LIPID PANEL: CPT

## 2020-03-06 PROCEDURE — 36415 COLL VENOUS BLD VENIPUNCTURE: CPT

## 2020-03-06 PROCEDURE — 80053 COMPREHEN METABOLIC PANEL: CPT

## 2020-03-06 PROCEDURE — 83735 ASSAY OF MAGNESIUM: CPT

## 2020-03-06 PROCEDURE — 83036 HEMOGLOBIN GLYCOSYLATED A1C: CPT

## 2020-03-07 LAB
ALBUMIN SERPL-MCNC: 4 G/DL (ref 3.7–4.7)
ALBUMIN/GLOB SERPL: 1.6 {RATIO} (ref 1.2–2.2)
ALP SERPL-CCNC: 86 IU/L (ref 39–117)
ALT SERPL-CCNC: 28 IU/L (ref 0–44)
AST SERPL-CCNC: 34 IU/L (ref 0–40)
BILIRUB SERPL-MCNC: 0.6 MG/DL (ref 0–1.2)
BUN SERPL-MCNC: 25 MG/DL (ref 8–27)
BUN/CREAT SERPL: 22 (ref 10–24)
CALCIUM SERPL-MCNC: 8.6 MG/DL (ref 8.6–10.2)
CHLORIDE SERPL-SCNC: 107 MMOL/L (ref 96–106)
CHOLEST SERPL-MCNC: 124 MG/DL (ref 100–199)
CO2 SERPL-SCNC: 17 MMOL/L (ref 20–29)
CREAT SERPL-MCNC: 1.14 MG/DL (ref 0.76–1.27)
EST. AVERAGE GLUCOSE BLD GHB EST-MCNC: 126 MG/DL
GLOBULIN SER CALC-MCNC: 2.5 G/DL (ref 1.5–4.5)
GLUCOSE SERPL-MCNC: 105 MG/DL (ref 65–99)
HBA1C MFR BLD: 6 % (ref 4.8–5.6)
HDLC SERPL-MCNC: 33 MG/DL
INTERPRETATION, 910389: NORMAL
LDLC SERPL CALC-MCNC: 74 MG/DL (ref 0–99)
MAGNESIUM SERPL-MCNC: 2 MG/DL (ref 1.6–2.3)
POTASSIUM SERPL-SCNC: 5.2 MMOL/L (ref 3.5–5.2)
PROT SERPL-MCNC: 6.5 G/DL (ref 6–8.5)
SODIUM SERPL-SCNC: 140 MMOL/L (ref 134–144)
TRIGL SERPL-MCNC: 87 MG/DL (ref 0–149)
VLDLC SERPL CALC-MCNC: 17 MG/DL (ref 5–40)

## 2020-08-06 RX ORDER — NITROGLYCERIN 0.4 MG/1
0.4 TABLET SUBLINGUAL
Qty: 25 TAB | Refills: 2 | Status: SHIPPED | OUTPATIENT
Start: 2020-08-06 | End: 2021-06-27

## 2020-08-26 RX ORDER — METOPROLOL SUCCINATE 25 MG/1
TABLET, EXTENDED RELEASE ORAL
Qty: 90 TAB | Refills: 3 | Status: SHIPPED | OUTPATIENT
Start: 2020-08-26 | End: 2020-10-30 | Stop reason: SDUPTHER

## 2020-09-05 ENCOUNTER — APPOINTMENT (OUTPATIENT)
Dept: CT IMAGING | Age: 75
End: 2020-09-05
Attending: NURSE PRACTITIONER
Payer: MEDICARE

## 2020-09-05 ENCOUNTER — HOSPITAL ENCOUNTER (EMERGENCY)
Age: 75
Discharge: HOME | End: 2020-09-05
Attending: EMERGENCY MEDICINE | Admitting: EMERGENCY MEDICINE
Payer: MEDICARE

## 2020-09-05 VITALS
OXYGEN SATURATION: 96 % | SYSTOLIC BLOOD PRESSURE: 135 MMHG | HEART RATE: 62 BPM | DIASTOLIC BLOOD PRESSURE: 78 MMHG | RESPIRATION RATE: 16 BRPM | TEMPERATURE: 98 F

## 2020-09-05 DIAGNOSIS — R10.9 FLANK PAIN: Primary | ICD-10-CM

## 2020-09-05 DIAGNOSIS — N20.0 KIDNEY STONE: ICD-10-CM

## 2020-09-05 LAB
ALBUMIN SERPL-MCNC: 4 G/DL (ref 3.5–5)
ALBUMIN/GLOB SERPL: 1.1 {RATIO} (ref 1.1–2.2)
ALP SERPL-CCNC: 92 U/L (ref 45–117)
ALT SERPL-CCNC: 35 U/L (ref 12–78)
ANION GAP SERPL CALC-SCNC: 5 MMOL/L (ref 5–15)
APPEARANCE UR: ABNORMAL
AST SERPL-CCNC: 27 U/L (ref 15–37)
BACTERIA URNS QL MICRO: NEGATIVE /HPF
BASOPHILS # BLD: 0.1 K/UL (ref 0–0.1)
BASOPHILS NFR BLD: 1 % (ref 0–1)
BILIRUB SERPL-MCNC: 0.7 MG/DL (ref 0.2–1)
BILIRUB UR QL: NEGATIVE
BUN SERPL-MCNC: 27 MG/DL (ref 6–20)
BUN/CREAT SERPL: 19 (ref 12–20)
CALCIUM SERPL-MCNC: 9.6 MG/DL (ref 8.5–10.1)
CHLORIDE SERPL-SCNC: 110 MMOL/L (ref 97–108)
CO2 SERPL-SCNC: 25 MMOL/L (ref 21–32)
COLOR UR: ABNORMAL
COMMENT, HOLDF: NORMAL
CREAT SERPL-MCNC: 1.41 MG/DL (ref 0.7–1.3)
DIFFERENTIAL METHOD BLD: ABNORMAL
EOSINOPHIL # BLD: 0 K/UL (ref 0–0.4)
EOSINOPHIL NFR BLD: 0 % (ref 0–7)
EPITH CASTS URNS QL MICRO: ABNORMAL /LPF
ERYTHROCYTE [DISTWIDTH] IN BLOOD BY AUTOMATED COUNT: 13 % (ref 11.5–14.5)
GLOBULIN SER CALC-MCNC: 3.7 G/DL (ref 2–4)
GLUCOSE SERPL-MCNC: 108 MG/DL (ref 65–100)
GLUCOSE UR STRIP.AUTO-MCNC: NEGATIVE MG/DL
HCT VFR BLD AUTO: 46 % (ref 36.6–50.3)
HGB BLD-MCNC: 15.5 G/DL (ref 12.1–17)
HGB UR QL STRIP: ABNORMAL
HYALINE CASTS URNS QL MICRO: ABNORMAL /LPF (ref 0–5)
IMM GRANULOCYTES # BLD AUTO: 0.1 K/UL (ref 0–0.04)
IMM GRANULOCYTES NFR BLD AUTO: 0 % (ref 0–0.5)
KETONES UR QL STRIP.AUTO: NEGATIVE MG/DL
LEUKOCYTE ESTERASE UR QL STRIP.AUTO: ABNORMAL
LYMPHOCYTES # BLD: 1.2 K/UL (ref 0.8–3.5)
LYMPHOCYTES NFR BLD: 10 % (ref 12–49)
MCH RBC QN AUTO: 31 PG (ref 26–34)
MCHC RBC AUTO-ENTMCNC: 33.7 G/DL (ref 30–36.5)
MCV RBC AUTO: 92 FL (ref 80–99)
MONOCYTES # BLD: 0.7 K/UL (ref 0–1)
MONOCYTES NFR BLD: 6 % (ref 5–13)
NEUTS SEG # BLD: 10.1 K/UL (ref 1.8–8)
NEUTS SEG NFR BLD: 83 % (ref 32–75)
NITRITE UR QL STRIP.AUTO: NEGATIVE
NRBC # BLD: 0 K/UL (ref 0–0.01)
NRBC BLD-RTO: 0 PER 100 WBC
PH UR STRIP: 6.5 [PH] (ref 5–8)
PLATELET # BLD AUTO: 246 K/UL (ref 150–400)
PMV BLD AUTO: 10.3 FL (ref 8.9–12.9)
POTASSIUM SERPL-SCNC: 4.3 MMOL/L (ref 3.5–5.1)
PROT SERPL-MCNC: 7.7 G/DL (ref 6.4–8.2)
PROT UR STRIP-MCNC: 30 MG/DL
RBC # BLD AUTO: 5 M/UL (ref 4.1–5.7)
RBC #/AREA URNS HPF: >100 /HPF (ref 0–5)
SAMPLES BEING HELD,HOLD: NORMAL
SODIUM SERPL-SCNC: 140 MMOL/L (ref 136–145)
SP GR UR REFRACTOMETRY: 1.02 (ref 1–1.03)
UR CULT HOLD, URHOLD: NORMAL
UROBILINOGEN UR QL STRIP.AUTO: 1 EU/DL (ref 0.2–1)
WBC # BLD AUTO: 12.1 K/UL (ref 4.1–11.1)
WBC URNS QL MICRO: ABNORMAL /HPF (ref 0–4)

## 2020-09-05 PROCEDURE — 96375 TX/PRO/DX INJ NEW DRUG ADDON: CPT

## 2020-09-05 PROCEDURE — 81001 URINALYSIS AUTO W/SCOPE: CPT

## 2020-09-05 PROCEDURE — 74011250636 HC RX REV CODE- 250/636: Performed by: NURSE PRACTITIONER

## 2020-09-05 PROCEDURE — 96374 THER/PROPH/DIAG INJ IV PUSH: CPT

## 2020-09-05 PROCEDURE — 74176 CT ABD & PELVIS W/O CONTRAST: CPT

## 2020-09-05 PROCEDURE — 85025 COMPLETE CBC W/AUTO DIFF WBC: CPT

## 2020-09-05 PROCEDURE — 87086 URINE CULTURE/COLONY COUNT: CPT

## 2020-09-05 PROCEDURE — 80053 COMPREHEN METABOLIC PANEL: CPT

## 2020-09-05 PROCEDURE — 99283 EMERGENCY DEPT VISIT LOW MDM: CPT

## 2020-09-05 PROCEDURE — 74011000258 HC RX REV CODE- 258: Performed by: NURSE PRACTITIONER

## 2020-09-05 RX ORDER — CEPHALEXIN 500 MG/1
500 CAPSULE ORAL 4 TIMES DAILY
Qty: 28 CAP | Refills: 0 | Status: SHIPPED | OUTPATIENT
Start: 2020-09-05 | End: 2020-09-05

## 2020-09-05 RX ORDER — ONDANSETRON 2 MG/ML
4 INJECTION INTRAMUSCULAR; INTRAVENOUS
Status: COMPLETED | OUTPATIENT
Start: 2020-09-05 | End: 2020-09-05

## 2020-09-05 RX ORDER — CEPHALEXIN 500 MG/1
500 CAPSULE ORAL 4 TIMES DAILY
Qty: 28 CAP | Refills: 0 | Status: SHIPPED | OUTPATIENT
Start: 2020-09-05 | End: 2020-09-12

## 2020-09-05 RX ORDER — ONDANSETRON 4 MG/1
4 TABLET, FILM COATED ORAL
Qty: 15 TAB | Refills: 0 | Status: SHIPPED | OUTPATIENT
Start: 2020-09-05 | End: 2020-09-05

## 2020-09-05 RX ORDER — HYDROCODONE BITARTRATE AND ACETAMINOPHEN 5; 325 MG/1; MG/1
1 TABLET ORAL
Qty: 10 TAB | Refills: 0 | Status: SHIPPED | OUTPATIENT
Start: 2020-09-05 | End: 2020-09-08

## 2020-09-05 RX ORDER — KETOROLAC TROMETHAMINE 30 MG/ML
30 INJECTION, SOLUTION INTRAMUSCULAR; INTRAVENOUS ONCE
Status: COMPLETED | OUTPATIENT
Start: 2020-09-05 | End: 2020-09-05

## 2020-09-05 RX ORDER — HYDROCODONE BITARTRATE AND ACETAMINOPHEN 5; 325 MG/1; MG/1
1 TABLET ORAL
Qty: 10 TAB | Refills: 0 | Status: SHIPPED | OUTPATIENT
Start: 2020-09-05 | End: 2020-09-05

## 2020-09-05 RX ORDER — ONDANSETRON 4 MG/1
4 TABLET, FILM COATED ORAL
Qty: 15 TAB | Refills: 0 | Status: SHIPPED | OUTPATIENT
Start: 2020-09-05

## 2020-09-05 RX ADMIN — CEFTRIAXONE SODIUM 1 G: 1 INJECTION, POWDER, FOR SOLUTION INTRAMUSCULAR; INTRAVENOUS at 19:45

## 2020-09-05 RX ADMIN — SODIUM CHLORIDE 1000 ML: 9 INJECTION, SOLUTION INTRAVENOUS at 18:06

## 2020-09-05 RX ADMIN — ONDANSETRON 4 MG: 2 INJECTION INTRAMUSCULAR; INTRAVENOUS at 18:07

## 2020-09-05 RX ADMIN — KETOROLAC TROMETHAMINE 30 MG: 30 INJECTION, SOLUTION INTRAMUSCULAR at 18:07

## 2020-09-05 NOTE — ED TRIAGE NOTES
Arrived from home c/o kidney stone on right side and radiates to right testicle. Pain for 5 hours and reports hematuria. History of kidney stones.

## 2020-09-05 NOTE — DISCHARGE INSTRUCTIONS
Patient Education        Flank Pain: Care Instructions  Your Care Instructions  Flank pain is pain on the side of the back just below the rib cage and above the waist. It can be on one or both sides. Flank pain has many possible causes, including a kidney stone, a urinary tract infection, or back strain. Flank pain may get better on its own. But don't ignore new symptoms, such as fever, nausea and vomiting, urination problems, pain that gets worse, and dizziness. These may be signs of a more serious problem. You may have to have tests or other treatment. Follow-up care is a key part of your treatment and safety. Be sure to make and go to all appointments, and call your doctor if you are having problems. It's also a good idea to know your test results and keep a list of the medicines you take. How can you care for yourself at home? · Rest until you feel better. · Take pain medicines exactly as directed. ? If the doctor gave you a prescription medicine for pain, take it as prescribed. ? If you are not taking a prescription pain medicine, ask your doctor if you can take an over-the-counter pain medicine, such as acetaminophen (Tylenol), ibuprofen (Advil, Motrin), or naproxen (Aleve). Read and follow all instructions on the label. · If your doctor prescribed antibiotics, take them as directed. Do not stop taking them just because you feel better. You need to take the full course of antibiotics. · To apply heat, put a warm water bottle, a heating pad set on low, or a warm cloth on the painful area. Do not go to sleep with a heating pad on your skin. · To prevent dehydration, drink plenty of fluids, enough so that your urine is light yellow or clear like water. Choose water and other caffeine-free clear liquids until you feel better. If you have kidney, heart, or liver disease and have to limit fluids, talk with your doctor before you increase the amount of fluids you drink. When should you call for help? Call your doctor now or seek immediate medical care if:    · Your flank pain gets worse.     · You have new symptoms, such as fever, nausea, or vomiting.     · You have symptoms of a urinary problem. For example:  ? You have blood or pus in your urine. ? You have chills or body aches. ? It hurts to urinate. ? You have groin or belly pain. Watch closely for changes in your health, and be sure to contact your doctor if you do not get better as expected. Where can you learn more? Go to http://alonso-lamar.info/  Enter S191 in the search box to learn more about \"Flank Pain: Care Instructions. \"  Current as of: June 26, 2019               Content Version: 12.6  © 9035-4843 [employer]. Care instructions adapted under license by [employer] (which disclaims liability or warranty for this information). If you have questions about a medical condition or this instruction, always ask your healthcare professional. [place] [address] any warranty or liability for your use of this information. Patient Education        Learning About Diet for Kidney Stone Prevention  What are kidney stones? Kidney stones are small \"rafa\" that form in your kidneys. They're made of salts and minerals in the urine. Stones may not cause a problem as long as they stay in the kidneys. But they can cause sudden, severe pain. Pain is most likely when the stones travel through the ureters (the tubes that carry urine from the kidneys to the bladder). Kidney stones can cause bloody urine. Kidney stones often run in families. You are more likely to get them if you don't drink enough fluids, mainly water. Certain foods and drinks and some dietary supplements may also increase your risk for kidney stones if you consume too much of them. What can you do to prevent kidney stones? Changing what you eat may not prevent all types of kidney stones.  But for people who have a history of certain kinds of kidney stones, some changes in diet may help. A dietitian can help you set up a meal plan that includes healthy, low-oxalate choices. Here are some general guidelines to get you started. Plan your meals and snacks around foods that are low in oxalate. These foods include:  · Corn, kale, parsnips, and squash,. · Beef, chicken, pork, turkey, and fish. · Milk, butter, cheese, and yogurt. You can eat certain foods that are medium-high in oxalate, but eat them only once in a while. These foods include:  · Bread. · Brown rice. · English muffins. · Figs. · Popcorn. · String beans. · Tomatoes. Limit very high-oxalate foods, including:  · Black tea. · Coffee. · Chocolate. · Dark green vegetables. · Nuts. Here are some other things you can do to help prevent kidney stones. · Drink plenty of fluids. If you have kidney, heart, or liver disease and have to limit fluids, talk with your doctor before you increase the amount of fluids you drink. · Do not take more than the recommended daily dose of vitamins C and D.  · Limit the salt in your diet. · Eat a balanced diet that is not too high in protein. Follow-up care is a key part of your treatment and safety. Be sure to make and go to all appointments, and call your doctor if you are having problems. It's also a good idea to know your test results and keep a list of the medicines you take. Where can you learn more? Go to http://alonso-lamar.info/  Enter C138 in the search box to learn more about \"Learning About Diet for Kidney Stone Prevention. \"  Current as of: August 22, 2019               Content Version: 12.6  © 7588-6141 [employer] Care instructions adapted under license by [employer] (which disclaims liability or warranty for this information).  If you have questions about a medical condition or this instruction, always ask your healthcare professional. [employer] disclaims any warranty or liability for your use of this information. Patient Education        Kidney Stone: Care Instructions  Your Care Instructions     Kidney stones are formed when salts, minerals, and other substances normally found in the urine clump together. They can be as small as grains of sand or, rarely, as large as golf balls. While the stone is traveling through the ureter, which is the tube that carries urine from the kidney to the bladder, you will probably feel pain. The pain may be mild or very severe. You may also have some blood in your urine. As soon as the stone reaches the bladder, any intense pain should go away. If a stone is too large to pass on its own, you may need a medical procedure to help you pass the stone. The doctor has checked you carefully, but problems can develop later. If you notice any problems or new symptoms, get medical treatment right away. Follow-up care is a key part of your treatment and safety. Be sure to make and go to all appointments, and call your doctor if you are having problems. It's also a good idea to know your test results and keep a list of the medicines you take. How can you care for yourself at home? · Drink plenty of fluids, enough so that your urine is light yellow or clear like water. If you have kidney, heart, or liver disease and have to limit fluids, talk with your doctor before you increase the amount of fluids you drink. · Take pain medicines exactly as directed. Call your doctor if you think you are having a problem with your medicine. ? If the doctor gave you a prescription medicine for pain, take it as prescribed. ? If you are not taking a prescription pain medicine, ask your doctor if you can take an over-the-counter medicine. Read and follow all instructions on the label. · Your doctor may ask you to strain your urine so that you can collect your kidney stone when it passes.  You can use a kitchen strainer or a tea strainer to catch the stone. Store it in a plastic bag until you see your doctor again. Preventing future kidney stones  Some changes in your diet may help prevent kidney stones. Depending on the cause of your stones, your doctor may recommend that you:  · Drink plenty of fluids, enough so that your urine is light yellow or clear like water. If you have kidney, heart, or liver disease and have to limit fluids, talk with your doctor before you increase the amount of fluids you drink. · Limit coffee, tea, and alcohol. Also avoid grapefruit juice. · Do not take more than the recommended daily dose of vitamins C and D.  · Avoid antacids such as Gaviscon, Maalox, Mylanta, or Tums. · Limit the amount of salt (sodium) in your diet. · Eat a balanced diet that is not too high in protein. · Limit foods that are high in a substance called oxalate, which can cause kidney stones. These foods include dark green vegetables, rhubarb, chocolate, wheat bran, nuts, cranberries, and beans. When should you call for help? Call your doctor now or seek immediate medical care if:    · You cannot keep down fluids.     · Your pain gets worse.     · You have a fever or chills.     · You have new or worse pain in your back just below your rib cage (the flank area).     · You have new or more blood in your urine. Watch closely for changes in your health, and be sure to contact your doctor if:    · You do not get better as expected. Where can you learn more? Go to http://alonso-lamar.info/  Enter U147 in the search box to learn more about \"Kidney Stone: Care Instructions. \"  Current as of: April 15, 2020               Content Version: 12.6  © 6324-0324 [employer]. Care instructions adapted under license by [employer] (which disclaims liability or warranty for this information).  If you have questions about a medical condition or this instruction, always ask your healthcare professional. [employer] disclaims any warranty or liability for your use of this information.

## 2020-09-05 NOTE — ED PROVIDER NOTES
This is a 79-year-old male who presents ambulatory to the emergency room with complaints of right flank pain. Patient states he had acute onset right flank pain about 5 hours ago with radiation into his right groin and right testicle. States difficulty in passing his urine. Patient states he also has blood in his urine. Denies any trauma. Denies any chest pain, shortness of breath, dizziness, fever or chills, nausea or vomiting. Patient does have a history of kidney stones and his urologist is Dr. Davide Lam with [facility]. Patient states the pain is consistent with a kidney stone that he has had in the past.  Has not taken any medication prior to arrival for his symptoms. There are no further complaints at this time.     Other, MD Tomás  Past Medical History:  No date: CAD (coronary artery disease)  No date: Essential hypertension  No date: Hyperlipidemia  3/10/2014: STEMI (ST elevation myocardial infarction) ([facility] [address]  No date: Thyroid disease  No date: Unspecified sleep apnea  Past Surgical History:  3/6/2014: CARDIAC SURG PROCEDURE UNLIST      Comment:  stent  2006: HX HERNIA REPAIR  2004: HX ORTHOPAEDIC      Comment:   right knee surgery             Past Medical History:   Diagnosis Date    CAD (coronary artery disease)     Essential hypertension     Hyperlipidemia     STEMI (ST elevation myocardial infarction) ([address] 3/10/2014    Thyroid disease     Unspecified sleep apnea        Past Surgical History:   Procedure Laterality Date    CARDIAC SURG PROCEDURE UNLIST  3/6/2014    stent    HX HERNIA REPAIR  2006    HX ORTHOPAEDIC  2004     right knee surgery         Family History:   Problem Relation Age of Onset    Cancer Mother 76        breast    Heart Disease Mother [de-identified]        MI, stents    Hypertension Mother     Heart Disease Father     Cancer Maternal Aunt         breast       Social History     Socioeconomic History    Marital status:      Spouse name: Not on file    Number of children: Not on file    Years of education: Not on file    Highest education level: Not on file   Occupational History    Not on file   Social Needs    Financial resource strain: Not on file    Food insecurity     Worry: Not on file     Inability: Not on file    Transportation needs     Medical: Not on file     Non-medical: Not on file   Tobacco Use    Smoking status: Never Smoker    Smokeless tobacco: Never Used   Substance and Sexual Activity    Alcohol use: Yes     Frequency: 2-4 times a month     Drinks per session: 1 or 2     Binge frequency: Never     Comment: once or twice a month    Drug use: No    Sexual activity: Not on file   Lifestyle    Physical activity     Days per week: Not on file     Minutes per session: Not on file    Stress: Not on file   Relationships    Social connections     Talks on phone: Not on file     Gets together: Not on file     Attends Jewish service: Not on file     Active member of club or organization: Not on file     Attends meetings of clubs or organizations: Not on file     Relationship status: Not on file    Intimate partner violence     Fear of current or ex partner: Not on file     Emotionally abused: Not on file     Physically abused: Not on file     Forced sexual activity: Not on file   Other Topics Concern    Not on file   Social History Narrative    Not on file         ALLERGIES: Shellfish derived    Review of Systems   Constitutional: Negative for activity change, appetite change, chills, fatigue and fever. HENT: Negative for congestion, ear discharge, ear pain, sinus pressure, sinus pain, sore throat and trouble swallowing. Eyes: Negative for photophobia, pain, redness, itching and visual disturbance. Respiratory: Negative for chest tightness and shortness of breath. Cardiovascular: Negative for chest pain and palpitations. Gastrointestinal: Negative for abdominal distention, abdominal pain, nausea and vomiting. Endocrine: Negative. Genitourinary: Positive for difficulty urinating, flank pain (radiating into the right groin), hematuria and testicular pain. Negative for frequency and urgency. Musculoskeletal: Negative for back pain, neck pain and neck stiffness. Skin: Negative for color change, pallor, rash and wound. Allergic/Immunologic: Negative. Neurological: Negative for dizziness, syncope, weakness and headaches. Hematological: Does not bruise/bleed easily. Psychiatric/Behavioral: Negative for behavioral problems. The patient is not nervous/anxious. Vitals:    09/05/20 1721   Pulse: 76   Temp: 97.9 °F (36.6 °C)            Physical Exam  Vitals signs and nursing note reviewed. Constitutional:       General: He is not in acute distress. Appearance: Normal appearance. He is well-developed. He is not ill-appearing. HENT:      Head: Normocephalic and atraumatic. Right Ear: External ear normal.      Left Ear: External ear normal.      Nose: Nose normal.      Mouth/Throat:      Mouth: Mucous membranes are moist.   Eyes:      General:         Right eye: No discharge. Left eye: No discharge. Conjunctiva/sclera: Conjunctivae normal.      Pupils: Pupils are equal, round, and reactive to light. Neck:      Musculoskeletal: Normal range of motion and neck supple. Vascular: No JVD. Trachea: No tracheal deviation. Cardiovascular:      Rate and Rhythm: Normal rate and regular rhythm. Pulses: Normal pulses. Heart sounds: Normal heart sounds. No murmur. No gallop. Pulmonary:      Effort: Pulmonary effort is normal. No respiratory distress. Breath sounds: Normal breath sounds. No wheezing or rales. Chest:      Chest wall: No tenderness. Abdominal:      General: Bowel sounds are normal. There is no distension. Palpations: Abdomen is soft. Tenderness: There is no abdominal tenderness. There is right CVA tenderness. There is no guarding or rebound.    Genitourinary: Comments: Positive hematuria  Musculoskeletal: Normal range of motion. General: No tenderness. Skin:     General: Skin is warm and dry. Capillary Refill: Capillary refill takes less than 2 seconds. Coloration: Skin is not pale. Findings: No erythema or rash. Neurological:      General: No focal deficit present. Mental Status: He is alert and oriented to person, place, and time. Motor: No weakness. Coordination: Coordination normal.   Psychiatric:         Mood and Affect: Mood normal.         Behavior: Behavior normal.         Thought Content: Thought content normal.         Judgment: Judgment normal.          MDM  Number of Diagnoses or Management Options  Flank pain: new and requires workup  Kidney stone: new and requires workup  Diagnosis management comments: Differential diagnosis includes urinary tract infection, pyelonephritis, kidney stone and others. Pain controlled in the emergency room. 1 dose of Rocephin given in the emergency room. Discussed plan of care with Dr. Wilmer Odom from urology. Patient okay to discharge to home and follow-up on Tuesday in the clinic. Return to the emergency room with worsening symptoms, fever. P.o. antibiotics ordered. Pain medications ordered. Antiemetics ordered. Patient in agreement with plan of care. Amount and/or Complexity of Data Reviewed  Clinical lab tests: ordered and reviewed  Tests in the radiology section of CPT®: ordered and reviewed  Discuss the patient with other providers: yes (Dr. Wilmer Odom  )      Labs Reviewed   URINALYSIS W/MICROSCOPIC - Abnormal; Notable for the following components:       Result Value    Appearance CLOUDY (*)     Protein 30 (*)     Blood LARGE (*)     Leukocyte Esterase SMALL (*)     RBC >100 (*)     All other components within normal limits   CBC WITH AUTOMATED DIFF - Abnormal; Notable for the following components:    WBC 12.1 (*)     NEUTROPHILS 83 (*)     LYMPHOCYTES 10 (*)     ABS. NEUTROPHILS 10.1 (*)     ABS. IMM. GRANS. 0.1 (*)     All other components within normal limits   METABOLIC PANEL, COMPREHENSIVE - Abnormal; Notable for the following components:    Chloride 110 (*)     Glucose 108 (*)     BUN 27 (*)     Creatinine 1.41 (*)     GFR est AA 60 (*)     GFR est non-AA 49 (*)     All other components within normal limits   URINE CULTURE HOLD SAMPLE   CULTURE, URINE   SAMPLES BEING HELD     Ct Abd Pelv Wo Cont    Result Date: 9/5/2020  IMPRESSION: 1. Partial obstruction of the right kidney and ureter by a 4.4 mm calculus in the distal ureter. 2. Other incidental findings. 8:44 PM  Spoke with Dr. Julia Douglas who is available for follow up on Tuesday. He is in agreement with plan of care. 8:44 PM  Pt has been reexamined. Pt has no new complaints, changes or physical findings. Care plan outlined and precautions discussed. All available results were reviewed with pt. All medications were reviewed with pt. All of pt's questions and concerns were addressed. Pt agrees to F/U as instructed and agrees to return to ED upon further deterioration. Pt is ready to go home.   Carla Gravely, NP        Procedures

## 2020-09-06 LAB
BACTERIA SPEC CULT: NORMAL
SERVICE CMNT-IMP: NORMAL

## 2020-10-30 ENCOUNTER — TELEPHONE (OUTPATIENT)
Dept: CARDIOLOGY CLINIC | Age: 75
End: 2020-10-30

## 2020-10-30 RX ORDER — METOPROLOL SUCCINATE 25 MG/1
TABLET, EXTENDED RELEASE ORAL
Qty: 90 TAB | Refills: 3 | Status: SHIPPED | OUTPATIENT
Start: 2020-10-30 | End: 2021-11-13

## 2020-10-30 NOTE — TELEPHONE ENCOUNTER
Patient is scheduled to have a procedure to remove a kidney stone through his back with [facility] om 11/10. Patient needs clearance to hold aspirin for 5 days prior to his procedure. Patient is also advised to hold any anti platelet and anticoag medications as well. Please advise.     Phone: [phone]

## 2020-10-30 NOTE — TELEPHONE ENCOUNTER
Requested Prescriptions     Signed Prescriptions Disp Refills    metoprolol succinate (TOPROL-XL) 25 mg XL tablet 90 Tab 3     Sig: TAKE 1 TABLET BY MOUTH EVERY DAY     Authorizing Provider: Mahesh Avendaño     Ordering User: Cas Saez     Per verbal orders

## 2020-10-30 NOTE — TELEPHONE ENCOUNTER
Returned patient's call, 2 pt identifiers used    Advised patient Dr. True Bryan did receive a clearance request from [facility] Dr. Brynn Sarabia. Patient is cleared to proceed with Left PCNL on 11/10/20 and may hold ASA 5 days prior and 1 day after. This has been faxed to [phone], confirmation fax received.

## 2021-02-03 ENCOUNTER — OFFICE VISIT (OUTPATIENT)
Dept: CARDIOLOGY CLINIC | Age: 76
End: 2021-02-03
Payer: MEDICARE

## 2021-02-03 VITALS
DIASTOLIC BLOOD PRESSURE: 82 MMHG | SYSTOLIC BLOOD PRESSURE: 140 MMHG | RESPIRATION RATE: 18 BRPM | HEIGHT: 71 IN | BODY MASS INDEX: 32.48 KG/M2 | HEART RATE: 78 BPM | WEIGHT: 232 LBS | OXYGEN SATURATION: 98 %

## 2021-02-03 DIAGNOSIS — E11.59 TYPE 2 DIABETES MELLITUS WITH OTHER CIRCULATORY COMPLICATION, WITHOUT LONG-TERM CURRENT USE OF INSULIN (HCC): ICD-10-CM

## 2021-02-03 DIAGNOSIS — I25.10 CORONARY ARTERY DISEASE INVOLVING NATIVE CORONARY ARTERY OF NATIVE HEART WITHOUT ANGINA PECTORIS: ICD-10-CM

## 2021-02-03 DIAGNOSIS — I10 HTN (HYPERTENSION), BENIGN: ICD-10-CM

## 2021-02-03 DIAGNOSIS — I21.3 ST ELEVATION MYOCARDIAL INFARCTION (STEMI), UNSPECIFIED ARTERY (HCC): Primary | ICD-10-CM

## 2021-02-03 DIAGNOSIS — E78.5 DYSLIPIDEMIA, GOAL LDL BELOW 70: ICD-10-CM

## 2021-02-03 DIAGNOSIS — N20.0 KIDNEY STONE: ICD-10-CM

## 2021-02-03 DIAGNOSIS — N40.0 BENIGN PROSTATIC HYPERPLASIA WITHOUT LOWER URINARY TRACT SYMPTOMS: ICD-10-CM

## 2021-02-03 DIAGNOSIS — R73.02 IGT (IMPAIRED GLUCOSE TOLERANCE): ICD-10-CM

## 2021-02-03 LAB
COMMENT, HOLDF: NORMAL
SAMPLES BEING HELD,HOLD: NORMAL

## 2021-02-03 PROCEDURE — 3017F COLORECTAL CA SCREEN DOC REV: CPT | Performed by: INTERNAL MEDICINE

## 2021-02-03 PROCEDURE — G8754 DIAS BP LESS 90: HCPCS | Performed by: INTERNAL MEDICINE

## 2021-02-03 PROCEDURE — 2022F DILAT RTA XM EVC RTNOPTHY: CPT | Performed by: INTERNAL MEDICINE

## 2021-02-03 PROCEDURE — G8510 SCR DEP NEG, NO PLAN REQD: HCPCS | Performed by: INTERNAL MEDICINE

## 2021-02-03 PROCEDURE — G8417 CALC BMI ABV UP PARAM F/U: HCPCS | Performed by: INTERNAL MEDICINE

## 2021-02-03 PROCEDURE — 1101F PT FALLS ASSESS-DOCD LE1/YR: CPT | Performed by: INTERNAL MEDICINE

## 2021-02-03 PROCEDURE — G8427 DOCREV CUR MEDS BY ELIG CLIN: HCPCS | Performed by: INTERNAL MEDICINE

## 2021-02-03 PROCEDURE — 93010 ELECTROCARDIOGRAM REPORT: CPT | Performed by: INTERNAL MEDICINE

## 2021-02-03 PROCEDURE — 99214 OFFICE O/P EST MOD 30 MIN: CPT | Performed by: INTERNAL MEDICINE

## 2021-02-03 PROCEDURE — G8753 SYS BP > OR = 140: HCPCS | Performed by: INTERNAL MEDICINE

## 2021-02-03 PROCEDURE — 3044F HG A1C LEVEL LT 7.0%: CPT | Performed by: INTERNAL MEDICINE

## 2021-02-03 PROCEDURE — G8536 NO DOC ELDER MAL SCRN: HCPCS | Performed by: INTERNAL MEDICINE

## 2021-02-03 RX ORDER — TAMSULOSIN HYDROCHLORIDE 0.4 MG/1
CAPSULE ORAL
COMMUNITY
Start: 2021-01-28

## 2021-02-03 RX ORDER — TADALAFIL 10 MG/1
10 TABLET ORAL AS NEEDED
COMMUNITY

## 2021-02-03 NOTE — PROGRESS NOTES
[facility] [place] [facility]:   [phone]    HPI: Corrine Gottron, a 70y.o. year-old who presents for follow up regarding CAD. Bp doing ok, no labs in the last year. Need to get those ordered today. General stable from a cardiovascular standpoint not having chest pain palpitations or shortness of breath of note. Tolerating his medications and compliant with therapy    He is working full time 8-3 comes home and takes a nap. Then when he gets up they have dinner. Encouraged him to exercise more, he is standing at work ,etc but just not walking/ exercising. He got a bad sneeze pain in the left chest a week ago that is still there now. Pulled an intercostal muscle it sounds like. Had kidney stones and no had lithotripsy. Had stent x 2 weeks then removal.   No chest pain. But eating well. Snacks at work but not too much. Some fatigue, doing ok on Toprol, prefers it now due to cost(prev on bystolic)    No epigastric pain or unsteadiness recently (his angina equivalent)  Denies chest pain or dyspnea  Denies dizziness, syncope, LE edema  Not exercising due to sciatic pain and hip pain. Prior to his MI in 2014 he felt unsteady on his feet and had epigastric pain  Says he thinks he has low testosterone    **A labs received 3/8/19  CBC ok, Mg 2.1, A1C 6.0%  TSH 4.8  , TG 78, LDL 59, HDL 34  BUN 20, Cr 1.2, K 4.5  Labs need checking today. Assessment/Plan:  1. CAD - LAD 99% proximal lesion with STEMI in 3/14, s/p PCI with LARA to LAD, MLI in other vessels, off Effient now, continue ASA 81mg daily toprol, atorvastatin  -just fatigue now, stress echo ok 3/19  2. Dyslipidemia - at goal on labs 3/19 LDL 59  3. Obesity - Body mass index is 32.36 kg/m². cont weight loss efforts  4. HTN-well controlled on Bystolic   5. VT--post cath/stent, on amio drip while inpatient, on metoprolol now, controlled, no sx  6.  Hx of chronic systolic heart failure - LVEF 30% post cath but now up to 65% by TTE,  on Bystolic   7. Hypothyroidism -  on levothyroxine  8. Edema - minimal today  9. Fatigue- improved on bystolic    10. PreDM- new diagnosis will watch diet and exercise check a1c today    Stress Echo 3/19 6:00,   Echo 5/16: LVEF 65%, grade 1 dd, mild MR, mild TR  Nuclear: 2/16, NL Mirna nuc, EF 58%  Echo 12/14 EF 65%    TTE 3/14 EF 30%, multiple WMA  Cath 3/14 100% proximal LAD, [id number] LARA  EF 30%, Mod  Anteroapical hypokinesis, normal valves    Soc no tob no etoh   Fhx + for CAD in father    He  has a past medical history of CAD (coronary artery disease), Essential hypertension, Hyperlipidemia, STEMI (ST elevation myocardial infarction) ([facility]) (3/10/2014), Thyroid disease, and Unspecified sleep apnea.    Cardiovascular ROS: no chest pain or dyspnea on exertion  Respiratory ROS: no cough, wheezing  Neurological ROS: no TIA or stroke symptoms  All other systems negative except as above.     PE  Vitals:    02/03/21 1013   BP: (!) 140/82   Pulse: 78   Resp: 18   SpO2: 98%   Weight: 232 lb (105.2 kg)   Height: 5' 11\" (1.803 m)    Body mass index is 32.36 kg/m².   General appearance - alert, well appearing, and in no distress  Mental status - affect appropriate to mood  Eyes - sclera anicteric, moist mucous membranes  Neck - supple, no significant adenopathy  Lymphatics - no  lymphadenopathy  Chest - clear to auscultation, no wheezes, rales or rhonchi  Heart - normal rate, regular rhythm, normal S1, S2, no murmurs, rubs, clicks or gallops  Abdomen - soft, nontender, nondistended  Back exam - full range of motion, no tenderness  Neurological - cranial nerves II through XII grossly intact, no focal deficit  Musculoskeletal - no muscular tenderness noted, normal strength  Extremities - peripheral pulses normal, trace LE edema  Skin - normal coloration  no rashes    12 lead ECG: NSR with poor R wave progression    Recent Labs:  Lab Results   Component Value Date/Time    Cholesterol, total 124 03/06/2020 07:47 AM    HDL Cholesterol  33 (L) 03/06/2020 07:47 AM    LDL, calculated 74 03/06/2020 07:47 AM    Triglyceride 87 03/06/2020 07:47 AM    CHOL/HDL Ratio 4.3 03/06/2014 07:22 AM     Lab Results   Component Value Date/Time    Creatinine 1.41 (H) 09/05/2020 05:58 PM     Lab Results   Component Value Date/Time    BUN 27 (H) 09/05/2020 05:58 PM     Lab Results   Component Value Date/Time    Potassium 4.3 09/05/2020 05:58 PM     Lab Results   Component Value Date/Time    Hemoglobin A1c 6.0 (H) 03/06/2020 07:56 AM     Lab Results   Component Value Date/Time    HGB 15.5 09/05/2020 05:58 PM     Lab Results   Component Value Date/Time    PLATELET 451 05/95/6131 05:58 PM       Reviewed:  Past Medical History:   Diagnosis Date    CAD (coronary artery disease)     Essential hypertension     Hyperlipidemia     STEMI (ST elevation myocardial infarction) ([facility] [address] 3/10/2014    Thyroid disease     Unspecified sleep apnea      Social History     Tobacco Use   Smoking Status Never Smoker   Smokeless Tobacco Never Used     Social History     Substance and Sexual Activity   Alcohol Use Yes    Frequency: 2-4 times a month    Drinks per session: 1 or 2    Binge frequency: Never    Comment: once or twice a month     Allergies   Allergen Reactions    Shellfish Derived Itching       Current Outpatient Medications   Medication Sig    tamsulosin (FLOMAX) 0.4 mg capsule TAKE 1 CAPSULE BY MOUTH EVERYDAY AT BEDTIME    metoprolol succinate (TOPROL-XL) 25 mg XL tablet TAKE 1 TABLET BY MOUTH EVERY DAY    nitroglycerin (NITROSTAT) 0.4 mg SL tablet 1 TAB BY SUBLINGUAL ROUTE EVERY FIVE (5) MINUTES AS NEEDED FOR CHEST PAIN (MAX DOSE OF 3). MAX DOSE OF 3    levothyroxine (SYNTHROID) 50 mcg tablet Take  by mouth Daily (before breakfast).  atorvastatin (LIPITOR) 20 mg tablet Take 1 Tab by mouth daily.  naproxen sodium (ALEVE) 220 mg cap Take 440 mg by mouth as needed.  multivitamin (ONE A DAY) tablet Take 1 tablet by mouth daily.     aspirin 81 mg chewable tablet Take 2 Tabs by mouth daily. (Patient taking differently: Take 81 mg by mouth daily.)    tadalafiL (Cialis) 10 mg tablet Take 10 mg by mouth as needed for Erectile Dysfunction.  ondansetron hcl (Zofran) 4 mg tablet Take 1 Tab by mouth every eight (8) hours as needed for Nausea or Vomiting.  levothyroxine (SYNTHROID) 25 mcg tablet 25 mcg daily. No current facility-administered medications for this visit.         Elise Grande MD  [facility] [place]  [facility] [facility] [address]  [place], [address] [place] [address] Avenue

## 2021-02-04 LAB
ALBUMIN SERPL-MCNC: 3.9 G/DL (ref 3.5–5)
ALBUMIN/GLOB SERPL: 1.3 {RATIO} (ref 1.1–2.2)
ALP SERPL-CCNC: 103 U/L (ref 45–117)
ALT SERPL-CCNC: 33 U/L (ref 12–78)
ANION GAP SERPL CALC-SCNC: 11 MMOL/L (ref 5–15)
AST SERPL-CCNC: 21 U/L (ref 15–37)
BILIRUB SERPL-MCNC: 0.7 MG/DL (ref 0.2–1)
BUN SERPL-MCNC: 17 MG/DL (ref 6–20)
BUN/CREAT SERPL: 14 (ref 12–20)
CALCIUM SERPL-MCNC: 9.4 MG/DL (ref 8.5–10.1)
CHLORIDE SERPL-SCNC: 107 MMOL/L (ref 97–108)
CHOLEST SERPL-MCNC: 124 MG/DL
CO2 SERPL-SCNC: 25 MMOL/L (ref 21–32)
CREAT SERPL-MCNC: 1.18 MG/DL (ref 0.7–1.3)
ERYTHROCYTE [DISTWIDTH] IN BLOOD BY AUTOMATED COUNT: 12.9 % (ref 11.5–14.5)
EST. AVERAGE GLUCOSE BLD GHB EST-MCNC: 126 MG/DL
GLOBULIN SER CALC-MCNC: 2.9 G/DL (ref 2–4)
GLUCOSE SERPL-MCNC: 93 MG/DL (ref 65–100)
HBA1C MFR BLD: 6 % (ref 4–5.6)
HCT VFR BLD AUTO: 44.2 % (ref 36.6–50.3)
HDLC SERPL-MCNC: 36 MG/DL
HDLC SERPL: 3.4 {RATIO} (ref 0–5)
HGB BLD-MCNC: 15 G/DL (ref 12.1–17)
LDLC SERPL CALC-MCNC: 65.8 MG/DL (ref 0–100)
LIPID PROFILE,FLP: NORMAL
MCH RBC QN AUTO: 30.7 PG (ref 26–34)
MCHC RBC AUTO-ENTMCNC: 33.9 G/DL (ref 30–36.5)
MCV RBC AUTO: 90.4 FL (ref 80–99)
NRBC # BLD: 0 K/UL (ref 0–0.01)
NRBC BLD-RTO: 0 PER 100 WBC
PLATELET # BLD AUTO: 240 K/UL (ref 150–400)
PMV BLD AUTO: 10.8 FL (ref 8.9–12.9)
POTASSIUM SERPL-SCNC: 4.5 MMOL/L (ref 3.5–5.1)
PROT SERPL-MCNC: 6.8 G/DL (ref 6.4–8.2)
PSA FREE MFR SERPL: 48.8 %
PSA FREE SERPL-MCNC: 0.83 NG/ML
PSA SERPL-MCNC: 1.7 NG/ML (ref 0–4)
RBC # BLD AUTO: 4.89 M/UL (ref 4.1–5.7)
SODIUM SERPL-SCNC: 143 MMOL/L (ref 136–145)
T4 SERPL-MCNC: 8.7 UG/DL (ref 4.5–12.1)
TRIGL SERPL-MCNC: 111 MG/DL (ref ?–150)
TSH SERPL DL<=0.05 MIU/L-ACNC: 3.03 UIU/ML (ref 0.36–3.74)
VLDLC SERPL CALC-MCNC: 22.2 MG/DL
WBC # BLD AUTO: 6.2 K/UL (ref 4.1–11.1)

## 2021-04-29 ENCOUNTER — TELEPHONE (OUTPATIENT)
Dept: CARDIOLOGY CLINIC | Age: 76
End: 2021-04-29

## 2021-04-29 NOTE — TELEPHONE ENCOUNTER
Returned call to patient, 2 pt identifiers used    Patient is not currently at [facility]. He does go to the location on [facility]. Labs faxed to [phone], confirmation fax received.

## 2021-05-05 RX ORDER — ATORVASTATIN CALCIUM 20 MG/1
TABLET, FILM COATED ORAL
Qty: 90 TAB | Refills: 3 | Status: SHIPPED | OUTPATIENT
Start: 2021-05-05

## 2021-06-27 RX ORDER — NITROGLYCERIN 0.4 MG/1
0.4 TABLET SUBLINGUAL
Qty: 25 TABLET | Refills: 2 | Status: SHIPPED | OUTPATIENT
Start: 2021-06-27

## 2021-08-10 ENCOUNTER — TELEPHONE (OUTPATIENT)
Dept: CARDIOLOGY CLINIC | Age: 76
End: 2021-08-10

## 2021-08-10 NOTE — TELEPHONE ENCOUNTER
Returned patient's call, 2 pt identifiers used    Advised Dr. David Hernandez recommends the Covid vaccine and Saman Alejandre or Maryjean Moritz are both good choices.

## 2021-08-10 NOTE — TELEPHONE ENCOUNTER
Patient is inquiring whether he should take the Covid vaccine and which would she recommended according to his medical history.         DLXBB:[phone]

## 2021-11-13 RX ORDER — METOPROLOL SUCCINATE 25 MG/1
TABLET, EXTENDED RELEASE ORAL
Qty: 90 TABLET | Refills: 3 | Status: SHIPPED | OUTPATIENT
Start: 2021-11-13

## 2022-10-28 ENCOUNTER — TELEPHONE (OUTPATIENT)
Dept: CARDIAC REHAB | Age: 77
End: 2022-10-28

## 2022-10-28 ENCOUNTER — TRANSCRIBE ORDER (OUTPATIENT)
Dept: CARDIAC REHAB | Age: 77
End: 2022-10-28

## 2022-10-28 DIAGNOSIS — Z95.5 STENTED CORONARY ARTERY: ICD-10-CM

## 2022-10-28 DIAGNOSIS — I21.3 STEMI (ST ELEVATION MYOCARDIAL INFARCTION) (HCC): Primary | ICD-10-CM

## 2022-10-28 NOTE — TELEPHONE ENCOUNTER
10/28/2022 Cardiac Rehab: Called Mr. Dez Don to discuss participation in the Cardiac Rehab Program following referral request from Dr. Dorita Habermann on 10/28/2022. Patient had STEMI/STENT at [facility] 10/15/2022 and would like to participate in CRP (He participated and completed in 2014). I did mention that I would need Dr. Darrin Chester office to send over the notes from [facility] for insurance purposes for documentation for her referral and he understands that. Mr. Petros Cruz mentioned that he is still working 830-330pm M-F, so he would need to be a 400pm participant. Mr. Dez Don is without questions or concerns and he will be expecting my call after we receive the records.  Aryan Magdaleno

## 2022-11-09 ENCOUNTER — HOSPITAL ENCOUNTER (OUTPATIENT)
Dept: CARDIAC REHAB | Age: 77
Discharge: HOME | End: 2022-11-09
Payer: MEDICARE

## 2022-11-09 VITALS — BODY MASS INDEX: 32.87 KG/M2 | WEIGHT: 234.8 LBS | HEIGHT: 71 IN

## 2022-11-09 PROCEDURE — 93797 PHYS/QHP OP CAR RHAB WO ECG: CPT

## 2022-11-09 PROCEDURE — 93798 PHYS/QHP OP CAR RHAB W/ECG: CPT

## 2022-11-09 RX ORDER — LISINOPRIL 2.5 MG/1
2.5 TABLET ORAL DAILY
COMMUNITY

## 2022-11-09 RX ORDER — PRASUGREL 10 MG/1
10 TABLET, FILM COATED ORAL
COMMUNITY

## 2022-11-09 RX ORDER — CARVEDILOL 6.25 MG/1
TABLET ORAL 2 TIMES DAILY WITH MEALS
COMMUNITY

## 2022-11-09 NOTE — CARDIO/PULMONARY
Mele Irizarry  78 y.o. Presented to cardiac wellness for orientation and exercise tolerance test today with a primary diagnosis of STEMI (10/15/2022) and stent 10/16/2022 . Mele Irizarry has a history of PCI. EF is 23%. Low sodium diet education and signs and symptoms of exacerbation were given. He is wearing his lifevest.  Cardiac risk factors include dyslipidemia, obesity, hypertension, and family history and these were reviewed with him. He still works FT so will exercise after work 2 days a week with us. PHQ9 depression score, is 1 and this is considered normal.The result was discussed with patient who affirms score to be accurate. Patient denied chest pain or SOB during 6 minute walk and was in SR/ST with inverted t wave. He had some slight hip pain but stated he was fine to walk on the TM as part of his exercise rx with us. Noemíchong Meyer was given a a copy of his exercise schedule and a cardiac rehab manual and all questions were answered. He told me he has a follow up stress test with Dr. Yasmany Langley on 11/16/2022 and I informed him we would like to have the updated EF for our records.

## 2022-11-10 ENCOUNTER — HOSPITAL ENCOUNTER (OUTPATIENT)
Dept: CARDIAC REHAB | Age: 77
Discharge: HOME | End: 2022-11-10
Payer: MEDICARE

## 2022-11-10 VITALS — WEIGHT: 234.8 LBS | BODY MASS INDEX: 32.75 KG/M2

## 2022-11-10 PROCEDURE — 93798 PHYS/QHP OP CAR RHAB W/ECG: CPT

## 2022-11-15 ENCOUNTER — HOSPITAL ENCOUNTER (OUTPATIENT)
Dept: CARDIAC REHAB | Age: 77
Discharge: HOME | End: 2022-11-15
Payer: MEDICARE

## 2022-11-15 VITALS — WEIGHT: 233.4 LBS | BODY MASS INDEX: 32.55 KG/M2

## 2022-11-15 PROCEDURE — 93798 PHYS/QHP OP CAR RHAB W/ECG: CPT

## 2022-11-17 ENCOUNTER — HOSPITAL ENCOUNTER (OUTPATIENT)
Dept: CARDIAC REHAB | Age: 77
Discharge: HOME | End: 2022-11-17
Payer: MEDICARE

## 2022-11-17 VITALS — BODY MASS INDEX: 32.75 KG/M2 | WEIGHT: 234.8 LBS

## 2022-11-17 PROCEDURE — 93798 PHYS/QHP OP CAR RHAB W/ECG: CPT

## 2022-11-22 ENCOUNTER — HOSPITAL ENCOUNTER (OUTPATIENT)
Dept: CARDIAC REHAB | Age: 77
Discharge: HOME | End: 2022-11-22
Payer: MEDICARE

## 2022-11-22 VITALS — WEIGHT: 234.6 LBS | BODY MASS INDEX: 32.72 KG/M2

## 2022-11-22 PROCEDURE — 93798 PHYS/QHP OP CAR RHAB W/ECG: CPT

## 2022-11-29 ENCOUNTER — HOSPITAL ENCOUNTER (OUTPATIENT)
Dept: CARDIAC REHAB | Age: 77
Discharge: HOME | End: 2022-11-29
Payer: MEDICARE

## 2022-11-29 VITALS — BODY MASS INDEX: 32.61 KG/M2 | WEIGHT: 233.8 LBS

## 2022-11-29 PROCEDURE — 93798 PHYS/QHP OP CAR RHAB W/ECG: CPT

## 2022-12-01 ENCOUNTER — APPOINTMENT (OUTPATIENT)
Dept: CARDIAC REHAB | Age: 77
End: 2022-12-01
Payer: MEDICARE

## 2022-12-06 ENCOUNTER — HOSPITAL ENCOUNTER (OUTPATIENT)
Dept: CARDIAC REHAB | Age: 77
Discharge: HOME | End: 2022-12-06
Payer: MEDICARE

## 2022-12-06 VITALS — WEIGHT: 235.8 LBS | BODY MASS INDEX: 32.89 KG/M2

## 2022-12-06 PROCEDURE — 93798 PHYS/QHP OP CAR RHAB W/ECG: CPT

## 2022-12-06 RX ORDER — SACUBITRIL AND VALSARTAN 24; 26 MG/1; MG/1
1 TABLET, FILM COATED ORAL 2 TIMES DAILY
COMMUNITY

## 2022-12-07 ENCOUNTER — HOSPITAL ENCOUNTER (OUTPATIENT)
Dept: CARDIAC REHAB | Age: 77
Discharge: HOME | End: 2022-12-07
Payer: MEDICARE

## 2022-12-07 VITALS — BODY MASS INDEX: 32.75 KG/M2 | WEIGHT: 234.8 LBS

## 2022-12-07 PROCEDURE — 93798 PHYS/QHP OP CAR RHAB W/ECG: CPT

## 2022-12-08 ENCOUNTER — HOSPITAL ENCOUNTER (OUTPATIENT)
Dept: CARDIAC REHAB | Age: 77
Discharge: HOME | End: 2022-12-08
Payer: MEDICARE

## 2022-12-08 VITALS — WEIGHT: 235.3 LBS | BODY MASS INDEX: 32.82 KG/M2

## 2022-12-08 PROCEDURE — 93798 PHYS/QHP OP CAR RHAB W/ECG: CPT

## 2022-12-13 ENCOUNTER — HOSPITAL ENCOUNTER (OUTPATIENT)
Dept: CARDIAC REHAB | Age: 77
Discharge: HOME | End: 2022-12-13
Payer: MEDICARE

## 2022-12-13 VITALS — BODY MASS INDEX: 32.58 KG/M2 | WEIGHT: 233.6 LBS

## 2022-12-13 PROCEDURE — 93798 PHYS/QHP OP CAR RHAB W/ECG: CPT

## 2022-12-14 ENCOUNTER — APPOINTMENT (OUTPATIENT)
Dept: CARDIAC REHAB | Age: 77
End: 2022-12-14
Payer: MEDICARE

## 2022-12-15 ENCOUNTER — APPOINTMENT (OUTPATIENT)
Dept: CARDIAC REHAB | Age: 77
End: 2022-12-15
Payer: MEDICARE

## 2022-12-15 ENCOUNTER — HOSPITAL ENCOUNTER (OUTPATIENT)
Dept: CARDIAC REHAB | Age: 77
Discharge: HOME | End: 2022-12-15
Payer: MEDICARE

## 2022-12-15 PROCEDURE — 93797 PHYS/QHP OP CAR RHAB WO ECG: CPT | Performed by: DIETITIAN, REGISTERED

## 2022-12-15 PROCEDURE — 93798 PHYS/QHP OP CAR RHAB W/ECG: CPT

## 2022-12-15 NOTE — CARDIO/PULMONARY
Cardiac Rehab Nutrition Assessment - 1:1 Evaluation           NAME: Priya Ross : 1945 AGE: 78 y.o. GENDER: male  CARDIAC REHAB ADMITTING DIAGNOSIS: STEMI (10/15/22) & stent (10/16/22)    PROBLEM LIST:  Patient Active Problem List   Diagnosis Code    Chest pain, unspecified R07.9    STEMI (ST elevation myocardial infarction) ([facility] [address] I21.3    Insulin resistance E88.81    HTN (hypertension), benign I10    Dyslipidemia, goal LDL below 70 E78.5    CAD (coronary artery disease) I25.10     LVEF 27%, wearing life vest      LABS:   Lab Results   Component Value Date/Time    Hemoglobin A1c 6.0 (H) 2021 11:20 AM     Lab Results   Component Value Date/Time    Cholesterol, total 124 2021 11:20 AM    HDL Cholesterol 36 2021 11:20 AM    LDL, calculated 65.8 2021 11:20 AM    VLDL, calculated 22.2 2021 11:20 AM    Triglyceride 111 2021 11:20 AM    CHOL/HDL Ratio 3.4 2021 11:20 AM         MEDICATIONS/SUPPLEMENTS:   [unfilled]  Prior to Admission medications    Medication Sig Start Date End Date Taking? Authorizing Provider   sacubitriL-valsartjosephine Diaz) 24-26 mg tablet Take 1 Tablet by mouth two (2) times a day. Provider, Historical   prasugreL (Effient) 10 mg tablet Take 10 mg by mouth. Provider, Historical   lisinopriL (PRINIVIL, ZESTRIL) 2.5 mg tablet Take 2.5 mg by mouth daily. Patient not taking: Reported on 2022    Provider, Historical   carvediloL (COREG) 6.25 mg tablet Take  by mouth two (2) times daily (with meals). Provider, Historical   nitroglycerin (NITROSTAT) 0.4 mg SL tablet 1 TAB BY SUBLINGUAL ROUTE EVERY FIVE (5) MINUTES AS NEEDED FOR CHEST PAIN (MAX DOSE OF 3).  MAX DOSE OF 3 21   Maria A De Oliveira MD   atorvastatin (LIPITOR) 20 mg tablet TAKE 1 TABLET BY MOUTH EVERY DAY  Patient taking differently: 80 mg. 21   Maria A De Oliveira MD   tamsulosin (FLOMAX) 0.4 mg capsule TAKE 1 CAPSULE BY MOUTH EVERYDAY AT BEDTIME 21   Provider, Historical   tadalafiL (CIALIS) 10 mg tablet Take 10 mg by mouth as needed for Erectile Dysfunction. Provider, Historical   levothyroxine (SYNTHROID) 50 mcg tablet Take  by mouth Daily (before breakfast). Provider, Historical   multivitamin (ONE A DAY) tablet Take 1 tablet by mouth daily. Provider, Historical   aspirin 81 mg chewable tablet Take 2 Tabs by mouth daily. Patient taking differently: Take 81 mg by mouth daily.  3/10/14   Eddie Dean NP           ANTHROPOMETRICS:    Ht Readings from Last 1 Encounters:   11/09/22 5' 11\" (1.803 m)      Wt Readings from Last 10 Encounters:   12/13/22 106 kg (233 lb 9.6 oz)   12/08/22 106.7 kg (235 lb 4.8 oz)   12/07/22 106.5 kg (234 lb 12.8 oz)   12/06/22 107 kg (235 lb 12.8 oz)   11/29/22 106.1 kg (233 lb 12.8 oz)   11/22/22 106.4 kg (234 lb 9.6 oz)   11/17/22 106.5 kg (234 lb 12.8 oz)   11/15/22 105.9 kg (233 lb 6.4 oz)   11/10/22 106.5 kg (234 lb 12.8 oz)   11/09/22 106.5 kg (234 lb 12.8 oz)      IBW:172 # +/- 10%  %IBW: 135 % +/- 10%    BMI: 32.6 kg/M2 Category: obese  Waist: 44 inches    Reported Wt Hx: 237 lbs on home scale prior to MI, notes a 10 lb weight loss to 221 lbs on home scale for net 16 lb loss in the past 2 months - relates this to reduced appetite     Reported Diet Hx:    Rate Your Plate Score: 47  (Score 58-72: Making many healthy choices; 41-57: Some choices need improving 24-40: many choices need improving)    24 HOUR DIET RECALL  Breakfast [place] Instant Breakfast made with whole milk, 1 Poptart   Lunch Half sandwich (could be ham, or tuna salad or peanut butter) with fruit cup and / or 2 cookies or cupcake with glass of whole milk   Dinner Last night [employer] hamburger with 3 curly fries, medium size Coke (didn't finish it all); usually drinks small glass of milk with dinner or a regular soda   Snacks    Beverages 1 cup coffee on the weekends, otherwise milk or soda or , reports some water but \"not enough\"     Priya Ross reports smaller portions since his MI. Eats 3 meals a day, no snacks except evening dessert of ice cream or miniature candy bars once or twice a week. Lunch is packed every day by his partner. Environmental/Social: works full time, resides with his partner    Estimated Energy Needs: 2367 (using Damaris Naik with AF 1.3)    NUTRITION INTERVENTION:  Nutrition 60 minute one-on-one education & goal setting with Jesús with Rosa Elena Jacobs relevant labs compared to ideals. Reviewed weight history and patient's verbalized weight goal as well as any real or perceived barriers to obtaining the goal. Collaborated with patient to set a specific short and long term weight goal.     Reviewed Rate Your Plate and conducted a verbal diet recall. Assessed for environmental, financial, psychosocial, physical and comorbidities that may impact the food and eating patterns / behaviors of [address] [place] [address] with patient to set specific nutrient goals as well as specific food / behavior changes that will help patient meet the overall goal of following a heart healthy eating pattern (using guidelines as set forth by the American Heart Association and modeled after healthful eating patterns as recognized by the USDA Dietary Guidelines such as DASH, Mediterranean or plant-based). Briefly reviewed with Rosa Elena Jacobs the nutrition information in the Cardiac Rehab patient education book and encouraged Rosa Elena Jacobs to read thoroughly, ask questions as needed, and use for future reference for heart healthy nutrition information. Supplemental handouts provided: Fast Food Best Bites, Heart Healthy Pantry recipes (quick & easy)    Rosa Elena Jacobs is not scheduled to participate in Cardiac Rehab group nutrition classes.           PATIENT GOALS:    Weight Goals:  Short Term Weight Goal:</= 230 lbs  Long Term Weight Goal:200-210 lbs    Nutrition Goals:  Daily Recommendations:  Calories: 1900 /day  (for weight loss)  Saturated Fat: no more than 12.5 g/day  Trans Fat: 0 g/day  Sodium: no more than 2000 mg/day    Other:  - read and compare food labels  - use restaurant nutrition info to help guide choices  - try Healthy Choice meals to reduce restaurant meals  - include fruit and / or vegetable with most meals  - replace dinner beverage with water (can add Jennings for flavor)        Questions addressed. Follow-up plans discussed. Mone Reynolds verbalized understanding.             Chandler Balderas, RD

## 2022-12-20 ENCOUNTER — APPOINTMENT (OUTPATIENT)
Dept: CARDIAC REHAB | Age: 77
End: 2022-12-20
Payer: MEDICARE

## 2022-12-21 ENCOUNTER — APPOINTMENT (OUTPATIENT)
Dept: CARDIAC REHAB | Age: 77
End: 2022-12-21
Payer: MEDICARE

## 2022-12-22 ENCOUNTER — APPOINTMENT (OUTPATIENT)
Dept: CARDIAC REHAB | Age: 77
End: 2022-12-22
Payer: MEDICARE

## 2022-12-22 VITALS — BODY MASS INDEX: 32.58 KG/M2 | WEIGHT: 233.6 LBS

## 2022-12-27 ENCOUNTER — HOSPITAL ENCOUNTER (OUTPATIENT)
Dept: CARDIAC REHAB | Age: 77
Discharge: HOME | End: 2022-12-27
Payer: MEDICARE

## 2022-12-27 VITALS — WEIGHT: 231.4 LBS | BODY MASS INDEX: 32.27 KG/M2

## 2022-12-27 PROCEDURE — 93798 PHYS/QHP OP CAR RHAB W/ECG: CPT

## 2022-12-28 ENCOUNTER — HOSPITAL ENCOUNTER (OUTPATIENT)
Dept: CARDIAC REHAB | Age: 77
Discharge: HOME | End: 2022-12-28
Payer: MEDICARE

## 2022-12-28 VITALS — BODY MASS INDEX: 32.27 KG/M2 | WEIGHT: 231.4 LBS

## 2022-12-28 PROCEDURE — 93798 PHYS/QHP OP CAR RHAB W/ECG: CPT

## 2022-12-29 ENCOUNTER — HOSPITAL ENCOUNTER (OUTPATIENT)
Dept: CARDIAC REHAB | Age: 77
Discharge: HOME | End: 2022-12-29
Payer: MEDICARE

## 2022-12-29 VITALS — WEIGHT: 231.4 LBS | BODY MASS INDEX: 32.27 KG/M2

## 2022-12-29 PROCEDURE — 93798 PHYS/QHP OP CAR RHAB W/ECG: CPT

## 2023-01-03 ENCOUNTER — HOSPITAL ENCOUNTER (OUTPATIENT)
Dept: CARDIAC REHAB | Age: 78
Discharge: HOME | End: 2023-01-03
Payer: MEDICARE

## 2023-01-03 VITALS — BODY MASS INDEX: 32.61 KG/M2 | WEIGHT: 233.8 LBS

## 2023-01-03 PROCEDURE — 93798 PHYS/QHP OP CAR RHAB W/ECG: CPT

## 2023-01-04 ENCOUNTER — HOSPITAL ENCOUNTER (OUTPATIENT)
Dept: CARDIAC REHAB | Age: 78
Discharge: HOME | End: 2023-01-04
Payer: MEDICARE

## 2023-01-04 VITALS — WEIGHT: 232.8 LBS | BODY MASS INDEX: 32.47 KG/M2

## 2023-01-04 PROCEDURE — 93798 PHYS/QHP OP CAR RHAB W/ECG: CPT

## 2023-01-05 ENCOUNTER — HOSPITAL ENCOUNTER (OUTPATIENT)
Dept: CARDIAC REHAB | Age: 78
Discharge: HOME | End: 2023-01-05
Payer: MEDICARE

## 2023-01-05 VITALS — WEIGHT: 234.6 LBS | BODY MASS INDEX: 32.72 KG/M2

## 2023-01-05 PROCEDURE — 93798 PHYS/QHP OP CAR RHAB W/ECG: CPT

## 2023-01-10 ENCOUNTER — HOSPITAL ENCOUNTER (OUTPATIENT)
Dept: CARDIAC REHAB | Age: 78
Discharge: HOME | End: 2023-01-10
Payer: MEDICARE

## 2023-01-10 VITALS — BODY MASS INDEX: 32.44 KG/M2 | WEIGHT: 232.6 LBS

## 2023-01-10 PROCEDURE — 93798 PHYS/QHP OP CAR RHAB W/ECG: CPT

## 2023-01-11 ENCOUNTER — HOSPITAL ENCOUNTER (OUTPATIENT)
Dept: CARDIAC REHAB | Age: 78
Discharge: HOME | End: 2023-01-11
Payer: MEDICARE

## 2023-01-11 VITALS — WEIGHT: 231.6 LBS | BODY MASS INDEX: 32.3 KG/M2

## 2023-01-11 PROCEDURE — 93798 PHYS/QHP OP CAR RHAB W/ECG: CPT

## 2023-01-12 ENCOUNTER — HOSPITAL ENCOUNTER (OUTPATIENT)
Dept: CARDIAC REHAB | Age: 78
Discharge: HOME | End: 2023-01-12
Payer: MEDICARE

## 2023-01-12 VITALS — BODY MASS INDEX: 32.36 KG/M2 | WEIGHT: 232 LBS

## 2023-01-12 PROCEDURE — 93798 PHYS/QHP OP CAR RHAB W/ECG: CPT

## 2023-01-16 ENCOUNTER — APPOINTMENT (OUTPATIENT)
Dept: GENERAL RADIOLOGY | Age: 78
End: 2023-01-16
Attending: STUDENT IN AN ORGANIZED HEALTH CARE EDUCATION/TRAINING PROGRAM
Payer: MEDICARE

## 2023-01-16 ENCOUNTER — HOSPITAL ENCOUNTER (EMERGENCY)
Age: 78
Discharge: LEFT BEFORE DISCHARGE | End: 2023-01-17
Attending: STUDENT IN AN ORGANIZED HEALTH CARE EDUCATION/TRAINING PROGRAM
Payer: MEDICARE

## 2023-01-16 VITALS
SYSTOLIC BLOOD PRESSURE: 125 MMHG | DIASTOLIC BLOOD PRESSURE: 77 MMHG | RESPIRATION RATE: 18 BRPM | OXYGEN SATURATION: 98 % | TEMPERATURE: 97.4 F | HEART RATE: 62 BPM

## 2023-01-16 DIAGNOSIS — R07.9 CHEST PAIN, UNSPECIFIED TYPE: Primary | ICD-10-CM

## 2023-01-16 LAB
ALBUMIN SERPL-MCNC: 3.4 G/DL (ref 3.5–5)
ALBUMIN/GLOB SERPL: 0.9 (ref 1.1–2.2)
ALP SERPL-CCNC: 112 U/L (ref 45–117)
ALT SERPL-CCNC: 32 U/L (ref 12–78)
ANION GAP SERPL CALC-SCNC: 2 MMOL/L (ref 5–15)
AST SERPL-CCNC: 23 U/L (ref 15–37)
BASOPHILS # BLD: 0.1 K/UL (ref 0–0.1)
BASOPHILS NFR BLD: 1 % (ref 0–1)
BILIRUB SERPL-MCNC: 0.6 MG/DL (ref 0.2–1)
BUN SERPL-MCNC: 26 MG/DL (ref 6–20)
BUN/CREAT SERPL: 24 (ref 12–20)
CALCIUM SERPL-MCNC: 8.6 MG/DL (ref 8.5–10.1)
CHLORIDE SERPL-SCNC: 111 MMOL/L (ref 97–108)
CO2 SERPL-SCNC: 26 MMOL/L (ref 21–32)
COMMENT, HOLDF: NORMAL
CREAT SERPL-MCNC: 1.09 MG/DL (ref 0.7–1.3)
DIFFERENTIAL METHOD BLD: ABNORMAL
EOSINOPHIL # BLD: 0.6 K/UL (ref 0–0.4)
EOSINOPHIL NFR BLD: 9 % (ref 0–7)
ERYTHROCYTE [DISTWIDTH] IN BLOOD BY AUTOMATED COUNT: 13.2 % (ref 11.5–14.5)
GLOBULIN SER CALC-MCNC: 3.6 G/DL (ref 2–4)
GLUCOSE SERPL-MCNC: 124 MG/DL (ref 65–100)
HCT VFR BLD AUTO: 46.5 % (ref 36.6–50.3)
HGB BLD-MCNC: 15.1 G/DL (ref 12.1–17)
IMM GRANULOCYTES # BLD AUTO: 0 K/UL (ref 0–0.04)
IMM GRANULOCYTES NFR BLD AUTO: 0 % (ref 0–0.5)
LYMPHOCYTES # BLD: 2.3 K/UL (ref 0.8–3.5)
LYMPHOCYTES NFR BLD: 32 % (ref 12–49)
MCH RBC QN AUTO: 30.3 PG (ref 26–34)
MCHC RBC AUTO-ENTMCNC: 32.5 G/DL (ref 30–36.5)
MCV RBC AUTO: 93.2 FL (ref 80–99)
MONOCYTES # BLD: 0.8 K/UL (ref 0–1)
MONOCYTES NFR BLD: 11 % (ref 5–13)
NEUTS SEG # BLD: 3.4 K/UL (ref 1.8–8)
NEUTS SEG NFR BLD: 47 % (ref 32–75)
NRBC # BLD: 0 K/UL (ref 0–0.01)
NRBC BLD-RTO: 0 PER 100 WBC
PLATELET # BLD AUTO: 220 K/UL (ref 150–400)
PMV BLD AUTO: 10.3 FL (ref 8.9–12.9)
POTASSIUM SERPL-SCNC: 4 MMOL/L (ref 3.5–5.1)
PROT SERPL-MCNC: 7 G/DL (ref 6.4–8.2)
RBC # BLD AUTO: 4.99 M/UL (ref 4.1–5.7)
SAMPLES BEING HELD,HOLD: NORMAL
SODIUM SERPL-SCNC: 139 MMOL/L (ref 136–145)
TROPONIN-HIGH SENSITIVITY: 18 NG/L (ref 0–76)
TROPONIN-HIGH SENSITIVITY: 19 NG/L (ref 0–76)
WBC # BLD AUTO: 7.2 K/UL (ref 4.1–11.1)

## 2023-01-16 PROCEDURE — 84484 ASSAY OF TROPONIN QUANT: CPT

## 2023-01-16 PROCEDURE — 36415 COLL VENOUS BLD VENIPUNCTURE: CPT

## 2023-01-16 PROCEDURE — 80053 COMPREHEN METABOLIC PANEL: CPT

## 2023-01-16 PROCEDURE — 71045 X-RAY EXAM CHEST 1 VIEW: CPT

## 2023-01-16 PROCEDURE — 85025 COMPLETE CBC W/AUTO DIFF WBC: CPT

## 2023-01-16 PROCEDURE — 93005 ELECTROCARDIOGRAM TRACING: CPT

## 2023-01-16 PROCEDURE — 83880 ASSAY OF NATRIURETIC PEPTIDE: CPT

## 2023-01-16 PROCEDURE — 99285 EMERGENCY DEPT VISIT HI MDM: CPT

## 2023-01-17 ENCOUNTER — APPOINTMENT (OUTPATIENT)
Dept: CARDIAC REHAB | Age: 78
End: 2023-01-17
Payer: MEDICARE

## 2023-01-17 LAB
ATRIAL RATE: 62 BPM
ATRIAL RATE: 66 BPM
BNP SERPL-MCNC: 482 PG/ML
CALCULATED P AXIS, ECG09: 46 DEGREES
CALCULATED P AXIS, ECG09: 53 DEGREES
CALCULATED R AXIS, ECG10: -11 DEGREES
CALCULATED R AXIS, ECG10: 1 DEGREES
CALCULATED T AXIS, ECG11: 140 DEGREES
CALCULATED T AXIS, ECG11: 147 DEGREES
DIAGNOSIS, 93000: NORMAL
DIAGNOSIS, 93000: NORMAL
P-R INTERVAL, ECG05: 180 MS
P-R INTERVAL, ECG05: 182 MS
Q-T INTERVAL, ECG07: 428 MS
Q-T INTERVAL, ECG07: 440 MS
QRS DURATION, ECG06: 80 MS
QRS DURATION, ECG06: 80 MS
QTC CALCULATION (BEZET), ECG08: 446 MS
QTC CALCULATION (BEZET), ECG08: 448 MS
VENTRICULAR RATE, ECG03: 62 BPM
VENTRICULAR RATE, ECG03: 66 BPM

## 2023-01-17 RX ORDER — GUAIFENESIN 100 MG/5ML
243 LIQUID (ML) ORAL
Status: DISCONTINUED | OUTPATIENT
Start: 2023-01-17 | End: 2023-01-17 | Stop reason: HOSPADM

## 2023-01-17 NOTE — ED TRIAGE NOTES
He has been having chest pain 8/10 for about 1 1/2 hours prior to arrival. He is wearing a life vest. He was recently hospitalized for an MI.

## 2023-01-17 NOTE — ED PROVIDER NOTES
79-year-old male with history of CAD status post multiple stents with LifeVest in place, HTN, HLD presents to the ED with chief complaint of central chest pain that began 1.5 hours ago. Pain is a heavy pressure, ranges between 7 and 10 in severity, is constant. No treatment prior to arrival.  Patient was last admitted for MI 3 months ago at [facility], had stent placed then. Patient says that this pain is the same pain he had at that time. Reports associated mild shortness of breath. No fevers, chills, cough, abdominal pain, nausea, vomiting, urinary symptoms, bowel symptoms. The history is provided by the patient. Chest Pain (Angina)   Associated symptoms include shortness of breath. Pertinent negatives include no cough and no fever.       Past Medical History:   Diagnosis Date    CAD (coronary artery disease)     Essential hypertension     Hyperlipidemia     STEMI (ST elevation myocardial infarction) ([facility] [address] 3/10/2014    Thyroid disease     Unspecified sleep apnea        Past Surgical History:   Procedure Laterality Date    HX HERNIA REPAIR  2006    HX ORTHOPAEDIC  2004     right knee surgery    [place] UNLISTED PROCEDURE CARDIAC SURGERY  3/6/2014    stent         Family History:   Problem Relation Age of Onset    Cancer Mother 76        breast    Heart Disease Mother [de-identified]        MI, stents    Hypertension Mother     Heart Disease Father     Cancer Maternal Aunt         breast       Social History     Socioeconomic History    Marital status:      Spouse name: Not on file    Number of children: Not on file    Years of education: Not on file    Highest education level: Not on file   Occupational History    Not on file   Tobacco Use    Smoking status: Never    Smokeless tobacco: Never   Vaping Use    Vaping Use: Never used   Substance and Sexual Activity    Alcohol use: Yes     Comment: once or twice a month    Drug use: No    Sexual activity: Not on file   Other Topics Concern    Not on file   Social History Narrative    Not on file     Social Determinants of Health     Financial Resource Strain: Not on file   Food Insecurity: Not on file   Transportation Needs: Not on file   Physical Activity: Not on file   Stress: Not on file   Social Connections: Not on file   Intimate Partner Violence: Not on file   Housing Stability: Not on file         ALLERGIES: Shellfish derived    Review of Systems   Constitutional:  Negative for chills and fever. Respiratory:  Positive for shortness of breath. Negative for cough. Cardiovascular:  Positive for chest pain. Vitals:    01/16/23 2140 01/16/23 2251   BP: (!) 141/80 125/77   Pulse: 76 62   Resp: 18 18   Temp: 97.4 °F (36.3 °C)    SpO2: 98% 98%            Physical Exam  Constitutional:       General: He is not in acute distress. Appearance: He is well-developed. HENT:      Head: Normocephalic and atraumatic. Eyes:      Conjunctiva/sclera: Conjunctivae normal.      Pupils: Pupils are equal, round, and reactive to light. Neck:      Trachea: No tracheal deviation. Cardiovascular:      Rate and Rhythm: Normal rate and regular rhythm. Heart sounds: No murmur heard. No friction rub. No gallop. Pulmonary:      Effort: No respiratory distress. Breath sounds: Normal breath sounds. Abdominal:      General: Bowel sounds are normal. There is no distension. Palpations: Abdomen is soft. Tenderness: There is no abdominal tenderness. Musculoskeletal:         General: No deformity. Cervical back: Neck supple. Skin:     General: Skin is warm and dry. Neurological:      Mental Status: He is alert and oriented to person, place, and time. Medical Decision Making  68-year-old male presenting to the ED with chest pain. Heart score of 6, high risk. EKG is without evidence of acute ischemia. Differential includes ACS, pneumonia, musculoskeletal pain. No concern for PE based on history.   Will obtain serial troponins, basic labs, chest x-ray. We will plan on admission to the hospitalist.    Amount and/or Complexity of Data Reviewed  Labs: ordered. Radiology: ordered. ECG/medicine tests: ordered. Risk  OTC drugs. Decision regarding hospitalization. ED Course as of 01/17/23 0028   Mon Jan 16, 2023 2139 ED EKG interpretation: 9:39 PM  Rhythm: normal sinus rhythm;  Rate (approx.): 66; Axis: normal; QRS interval: normal ; ST/T wave: non-specific changes; Other findings: low voltage. [JW]   e Jan 17, 2023   [facility] ED EKG interpretation:12:25 AM  Rhythm: normal sinus rhythm;  Rate (approx.): 62; Axis: normal; QRS interval: normal ; ST/T wave: non-specific changes; Other findings: unchanged from previous ekg. [JW]      ED Course User Index  [JW] Burt Renteria MD       Procedures    1:13 AM  Patient is leaving [address] He understands the risks of leaving his medical advice and agrees to return with any worsening of his condition. He knows he can come back at any time to continue his work-up. He is going to call his cardiologist first thing in the morning to follow-up as soon as possible. LABORATORY TESTS:  Labs Reviewed   CBC WITH AUTOMATED DIFF - Abnormal; Notable for the following components:       Result Value    EOSINOPHILS 9 (*)     ABS. EOSINOPHILS 0.6 (*)     All other components within normal limits   METABOLIC PANEL, COMPREHENSIVE - Abnormal; Notable for the following components:    Chloride 111 (*)     Anion gap 2 (*)     Glucose 124 (*)     BUN 26 (*)     BUN/Creatinine ratio 24 (*)     Albumin 3.4 (*)     A-G Ratio 0.9 (*)     All other components within normal limits   SAMPLES BEING HELD   TROPONIN-HIGH SENSITIVITY   TROPONIN-HIGH SENSITIVITY   NT-PRO BNP       IMAGING RESULTS:  XR CHEST PORT    Result Date: 1/16/2023  INDICATION: Chest pain. Portable AP view of the chest. Direct comparison made to prior chest x-ray dated March 2014. Cardiomediastinal silhouette is stable.  Lungs are grossly clear bilaterally. Pleural spaces are normal and there is no pneumothorax. Osseous structures are intact. No acute cardiopulmonary disease. MEDICATIONS GIVEN:  Medications   aspirin chewable tablet 243 mg (has no administration in time range)       IMPRESSION:  1.  Chest pain, unspecified type        PLAN:  Pt leaving AMA    Signed By: Renata Roper MD     January 17, 2023

## 2023-01-18 ENCOUNTER — APPOINTMENT (OUTPATIENT)
Dept: CARDIAC REHAB | Age: 78
End: 2023-01-18
Payer: MEDICARE

## 2023-01-19 ENCOUNTER — APPOINTMENT (OUTPATIENT)
Dept: CARDIAC REHAB | Age: 78
End: 2023-01-19
Payer: MEDICARE

## 2023-01-24 ENCOUNTER — HOSPITAL ENCOUNTER (OUTPATIENT)
Dept: CARDIAC REHAB | Age: 78
Discharge: HOME | End: 2023-01-24
Payer: MEDICARE

## 2023-01-24 VITALS — WEIGHT: 227.2 LBS | BODY MASS INDEX: 31.69 KG/M2

## 2023-01-24 PROCEDURE — 93798 PHYS/QHP OP CAR RHAB W/ECG: CPT

## 2023-01-25 ENCOUNTER — HOSPITAL ENCOUNTER (OUTPATIENT)
Dept: CARDIAC REHAB | Age: 78
Discharge: HOME | End: 2023-01-25
Payer: MEDICARE

## 2023-01-25 VITALS — BODY MASS INDEX: 31.72 KG/M2 | WEIGHT: 227.4 LBS

## 2023-01-25 PROCEDURE — 93798 PHYS/QHP OP CAR RHAB W/ECG: CPT

## 2023-01-26 ENCOUNTER — HOSPITAL ENCOUNTER (OUTPATIENT)
Dept: CARDIAC REHAB | Age: 78
Discharge: HOME | End: 2023-01-26
Payer: MEDICARE

## 2023-01-26 VITALS — WEIGHT: 227 LBS | BODY MASS INDEX: 31.66 KG/M2

## 2023-01-26 PROCEDURE — 93798 PHYS/QHP OP CAR RHAB W/ECG: CPT

## 2023-01-31 ENCOUNTER — HOSPITAL ENCOUNTER (OUTPATIENT)
Dept: CARDIAC REHAB | Age: 78
Discharge: HOME | End: 2023-01-31
Payer: MEDICARE

## 2023-01-31 VITALS — BODY MASS INDEX: 31.77 KG/M2 | WEIGHT: 227.8 LBS

## 2023-01-31 PROCEDURE — 93798 PHYS/QHP OP CAR RHAB W/ECG: CPT

## 2023-02-01 ENCOUNTER — HOSPITAL ENCOUNTER (OUTPATIENT)
Dept: CARDIAC REHAB | Age: 78
Discharge: HOME | End: 2023-02-01
Payer: MEDICARE

## 2023-02-01 VITALS — BODY MASS INDEX: 31.77 KG/M2 | WEIGHT: 227.8 LBS

## 2023-02-01 PROCEDURE — 93798 PHYS/QHP OP CAR RHAB W/ECG: CPT

## 2023-02-02 ENCOUNTER — HOSPITAL ENCOUNTER (OUTPATIENT)
Dept: CARDIAC REHAB | Age: 78
Discharge: HOME | End: 2023-02-02
Payer: MEDICARE

## 2023-02-02 VITALS — BODY MASS INDEX: 31.8 KG/M2 | WEIGHT: 228 LBS

## 2023-02-02 PROCEDURE — 93798 PHYS/QHP OP CAR RHAB W/ECG: CPT

## 2023-02-07 ENCOUNTER — HOSPITAL ENCOUNTER (OUTPATIENT)
Dept: CARDIAC REHAB | Age: 78
Discharge: HOME | End: 2023-02-07
Payer: MEDICARE

## 2023-02-07 VITALS — BODY MASS INDEX: 31.66 KG/M2 | WEIGHT: 227 LBS

## 2023-02-07 PROCEDURE — 93798 PHYS/QHP OP CAR RHAB W/ECG: CPT

## 2023-02-08 ENCOUNTER — HOSPITAL ENCOUNTER (OUTPATIENT)
Dept: CARDIAC REHAB | Age: 78
Discharge: HOME | End: 2023-02-08
Payer: MEDICARE

## 2023-02-08 VITALS — BODY MASS INDEX: 31.8 KG/M2 | WEIGHT: 228 LBS

## 2023-02-08 PROCEDURE — 93798 PHYS/QHP OP CAR RHAB W/ECG: CPT

## 2023-02-09 ENCOUNTER — HOSPITAL ENCOUNTER (OUTPATIENT)
Dept: CARDIAC REHAB | Age: 78
Discharge: HOME | End: 2023-02-09
Payer: MEDICARE

## 2023-02-09 VITALS — BODY MASS INDEX: 31.74 KG/M2 | WEIGHT: 227.6 LBS

## 2023-02-09 PROCEDURE — 93798 PHYS/QHP OP CAR RHAB W/ECG: CPT

## 2023-02-14 ENCOUNTER — APPOINTMENT (OUTPATIENT)
Dept: CARDIAC REHAB | Age: 78
End: 2023-02-14
Payer: MEDICARE

## 2023-02-15 ENCOUNTER — HOSPITAL ENCOUNTER (OUTPATIENT)
Dept: CARDIAC REHAB | Age: 78
Discharge: HOME | End: 2023-02-15
Payer: MEDICARE

## 2023-02-15 VITALS — BODY MASS INDEX: 31.72 KG/M2 | WEIGHT: 227.4 LBS

## 2023-02-15 PROCEDURE — 93798 PHYS/QHP OP CAR RHAB W/ECG: CPT

## 2023-02-16 ENCOUNTER — HOSPITAL ENCOUNTER (OUTPATIENT)
Dept: CARDIAC REHAB | Age: 78
Discharge: HOME | End: 2023-02-16
Payer: MEDICARE

## 2023-02-16 VITALS — BODY MASS INDEX: 31.72 KG/M2 | WEIGHT: 227.4 LBS

## 2023-02-16 PROCEDURE — 93798 PHYS/QHP OP CAR RHAB W/ECG: CPT

## 2023-02-21 ENCOUNTER — APPOINTMENT (OUTPATIENT)
Dept: CARDIAC REHAB | Age: 78
End: 2023-02-21
Payer: MEDICARE

## 2023-02-22 ENCOUNTER — APPOINTMENT (OUTPATIENT)
Dept: CARDIAC REHAB | Age: 78
End: 2023-02-22
Payer: MEDICARE

## 2023-02-23 ENCOUNTER — APPOINTMENT (OUTPATIENT)
Dept: CARDIAC REHAB | Age: 78
End: 2023-02-23
Payer: MEDICARE

## 2023-02-28 ENCOUNTER — APPOINTMENT (OUTPATIENT)
Dept: CARDIAC REHAB | Age: 78
End: 2023-02-28
Payer: MEDICARE

## 2023-02-28 ENCOUNTER — HOSPITAL ENCOUNTER (OUTPATIENT)
Dept: CARDIAC REHAB | Age: 78
Discharge: HOME | End: 2023-02-28
Payer: MEDICARE

## 2023-02-28 VITALS — BODY MASS INDEX: 31.77 KG/M2 | WEIGHT: 227.8 LBS

## 2023-02-28 PROCEDURE — 93798 PHYS/QHP OP CAR RHAB W/ECG: CPT

## 2023-03-01 ENCOUNTER — HOSPITAL ENCOUNTER (OUTPATIENT)
Dept: CARDIAC REHAB | Age: 78
Discharge: HOME | End: 2023-03-01
Payer: MEDICARE

## 2023-03-01 ENCOUNTER — APPOINTMENT (OUTPATIENT)
Dept: CARDIAC REHAB | Age: 78
End: 2023-03-01
Payer: MEDICARE

## 2023-03-01 VITALS — BODY MASS INDEX: 31.77 KG/M2 | WEIGHT: 227.8 LBS

## 2023-03-01 PROCEDURE — 93798 PHYS/QHP OP CAR RHAB W/ECG: CPT

## 2023-03-02 ENCOUNTER — HOSPITAL ENCOUNTER (OUTPATIENT)
Dept: CARDIAC REHAB | Age: 78
Discharge: HOME | End: 2023-03-02
Payer: MEDICARE

## 2023-03-02 ENCOUNTER — APPOINTMENT (OUTPATIENT)
Dept: CARDIAC REHAB | Age: 78
End: 2023-03-02
Payer: MEDICARE

## 2023-03-02 VITALS — BODY MASS INDEX: 31.77 KG/M2 | WEIGHT: 227.8 LBS

## 2023-03-02 PROCEDURE — 93798 PHYS/QHP OP CAR RHAB W/ECG: CPT

## 2023-03-02 NOTE — CARDIO/PULMONARY
Dilan Gonzalez  Completed phase II cardiac rehab and attended 36 sessions from 11/9/22-3/2/23. Dilan Gonzalez is interested in maintaining optimal health and will work with Dr. Greer Dodge. Dilan Gonzalez has improved his endurance and stamina through regular exercise, and has lost 7 lbs. Blood pressure is 98/60 and is WNL. He has also improved his nutrition and cardiac knowledge scores and these were reviewed with patient. Dilan Gonzalez plans to continue exercising at home, at a gym and has set revised goals that include cardio equipment, light weights and walking 3 to 5 times a week for at least 30 minutes.     Karley Patel  3/2/2023

## 2023-03-07 ENCOUNTER — APPOINTMENT (OUTPATIENT)
Dept: CARDIAC REHAB | Age: 78
End: 2023-03-07
Payer: MEDICARE

## 2023-03-08 ENCOUNTER — APPOINTMENT (OUTPATIENT)
Dept: CARDIAC REHAB | Age: 78
End: 2023-03-08
Payer: MEDICARE